# Patient Record
Sex: MALE | Race: OTHER | NOT HISPANIC OR LATINO
[De-identification: names, ages, dates, MRNs, and addresses within clinical notes are randomized per-mention and may not be internally consistent; named-entity substitution may affect disease eponyms.]

---

## 2021-12-01 PROBLEM — Z00.00 ENCOUNTER FOR PREVENTIVE HEALTH EXAMINATION: Status: ACTIVE | Noted: 2021-12-01

## 2021-12-02 ENCOUNTER — APPOINTMENT (OUTPATIENT)
Dept: CT IMAGING | Facility: IMAGING CENTER | Age: 51
End: 2021-12-02
Payer: COMMERCIAL

## 2021-12-02 ENCOUNTER — OUTPATIENT (OUTPATIENT)
Dept: OUTPATIENT SERVICES | Facility: HOSPITAL | Age: 51
LOS: 1 days | End: 2021-12-02
Payer: COMMERCIAL

## 2021-12-02 DIAGNOSIS — Z00.8 ENCOUNTER FOR OTHER GENERAL EXAMINATION: ICD-10-CM

## 2021-12-02 PROCEDURE — 70450 CT HEAD/BRAIN W/O DYE: CPT | Mod: 26

## 2021-12-02 PROCEDURE — 70450 CT HEAD/BRAIN W/O DYE: CPT

## 2023-02-13 ENCOUNTER — EMERGENCY (EMERGENCY)
Facility: HOSPITAL | Age: 53
LOS: 1 days | Discharge: HOME | End: 2023-02-13
Attending: EMERGENCY MEDICINE | Admitting: EMERGENCY MEDICINE
Payer: COMMERCIAL

## 2023-02-13 VITALS
OXYGEN SATURATION: 100 % | TEMPERATURE: 98 F | DIASTOLIC BLOOD PRESSURE: 62 MMHG | SYSTOLIC BLOOD PRESSURE: 106 MMHG | HEART RATE: 75 BPM | RESPIRATION RATE: 16 BRPM

## 2023-02-13 VITALS
OXYGEN SATURATION: 100 % | HEART RATE: 78 BPM | TEMPERATURE: 98 F | RESPIRATION RATE: 18 BRPM | SYSTOLIC BLOOD PRESSURE: 112 MMHG | DIASTOLIC BLOOD PRESSURE: 74 MMHG

## 2023-02-13 PROCEDURE — 72100 X-RAY EXAM L-S SPINE 2/3 VWS: CPT | Mod: 26

## 2023-02-13 PROCEDURE — 71101 X-RAY EXAM UNILAT RIBS/CHEST: CPT | Mod: 26,LT

## 2023-02-13 PROCEDURE — 71046 X-RAY EXAM CHEST 2 VIEWS: CPT | Mod: 26

## 2023-02-13 PROCEDURE — 99284 EMERGENCY DEPT VISIT MOD MDM: CPT

## 2023-02-13 RX ORDER — CYCLOBENZAPRINE HYDROCHLORIDE 10 MG/1
10 TABLET, FILM COATED ORAL ONCE
Refills: 0 | Status: COMPLETED | OUTPATIENT
Start: 2023-02-13 | End: 2023-02-13

## 2023-02-13 RX ORDER — OXYCODONE HYDROCHLORIDE 5 MG/1
5 TABLET ORAL ONCE
Refills: 0 | Status: DISCONTINUED | OUTPATIENT
Start: 2023-02-13 | End: 2023-02-13

## 2023-02-13 RX ORDER — LIDOCAINE 4 G/100G
1 CREAM TOPICAL ONCE
Refills: 0 | Status: COMPLETED | OUTPATIENT
Start: 2023-02-13 | End: 2023-02-13

## 2023-02-13 RX ORDER — ACETAMINOPHEN 500 MG
975 TABLET ORAL ONCE
Refills: 0 | Status: COMPLETED | OUTPATIENT
Start: 2023-02-13 | End: 2023-02-13

## 2023-02-13 RX ORDER — METHOCARBAMOL 500 MG/1
1500 TABLET, FILM COATED ORAL ONCE
Refills: 0 | Status: DISCONTINUED | OUTPATIENT
Start: 2023-02-13 | End: 2023-02-13

## 2023-02-13 RX ADMIN — CYCLOBENZAPRINE HYDROCHLORIDE 10 MILLIGRAM(S): 10 TABLET, FILM COATED ORAL at 09:43

## 2023-02-13 RX ADMIN — Medication 975 MILLIGRAM(S): at 09:43

## 2023-02-13 RX ADMIN — OXYCODONE HYDROCHLORIDE 5 MILLIGRAM(S): 5 TABLET ORAL at 11:25

## 2023-02-13 RX ADMIN — LIDOCAINE 1 PATCH: 4 CREAM TOPICAL at 09:43

## 2023-02-13 NOTE — ED ADULT NURSE NOTE - NSIMPLEMENTINTERV_GEN_ALL_ED
Implemented All Universal Safety Interventions:  [place] to call system. Call bell, personal items and telephone within reach. Instruct patient to call for assistance. Room bathroom lighting operational. Non-slip footwear when patient is off stretcher. Physically safe environment: no spills, clutter or unnecessary equipment. Stretcher in lowest position, wheels locked, appropriate side rails in place. Implemented All Universal Safety Interventions:  [place] to call system. Call bell, personal items and telephone within reach. Instruct patient to call for assistance. Room bathroom lighting operational. Non-slip footwear when patient is off stretcher. Physically safe environment: no spills, clutter or unnecessary equipment. Stretcher in lowest position, wheels locked, appropriate side rails in place. Implemented All Universal Safety Interventions:  [place] to call system. Call bell, personal items and telephone within reach. Instruct patient to call for assistance. Room bathroom lighting operational. Non-slip footwear when patient is off stretcher. Physically safe environment: no spills, clutter or unnecessary equipment. Stretcher in lowest position, wheels locked, appropriate side rails in place.

## 2023-02-13 NOTE — ED ADULT TRIAGE NOTE - CHIEF COMPLAINT QUOTE
Pt. c/o non-radiating lower back pain after lifting something heavy yesterday. States he felt like something pulled. Denies tingling/numbness to extremities. Ambulatory in triage.

## 2023-02-13 NOTE — ED PROVIDER NOTE - PHYSICAL EXAMINATION
GEN - NAD; well appearing; A+O x3   HEAD - NC/AT   EYES- PERRL, EOMI  ENT: Airway patent, mmm, Oral cavity and pharynx normal. No inflammation, swelling, exudate, or lesions.    NECK: Neck supple  PULMONARY - CTA b/l, symmetric breath sounds.   CARDIAC -s1s2, RRR, no M,G,R, no crepitus, mild ttp to left lower ribs  ABDOMEN - +BS, ND, NT, soft, no guarding, no rebound, no masses   BACK - no CVA tenderness, No deformity, no bruising, no stepoff, +midline lower lumbar spinal ttp   EXTREMITIES - FROM, no edema   SKIN - no rash or bruising   NEUROLOGIC - alert, speech clear, 5/5 str b/l ue and le, SILT, rectal tone normal (performed by dr. hamlin).  PSYCH -nl mood/affect, nl insight. GEN - NAD; well appearing; A+O x3   HEAD - NC/AT   EYES- PERRL, EOMI  ENT: Airway patent, mmm, Oral cavity and pharynx normal. No inflammation, swelling, exudate, or lesions.    NECK: Neck supple  PULMONARY - CTA b/l, symmetric breath sounds.   CARDIAC -s1s2, RRR, no M,G,R, no crepitus, mild ttp to left lower ribs  ABDOMEN - +BS, ND, NT, soft, no guarding, no rebound, no masses   BACK - no CVA tenderness, No deformity, no bruising, no stepoff, +midline lower lumbar spinal ttp   EXTREMITIES - FROM, no edema   SKIN - no rash or bruising   NEUROLOGIC - alert, speech clear, 5/5 str b/l ue and le, SILT, rectal tone normal (performed by dr. halmin).  PSYCH -nl mood/affect, nl insight.

## 2023-02-13 NOTE — ED PROVIDER NOTE - CLINICAL SUMMARY MEDICAL DECISION MAKING FREE TEXT BOX
No problem 52-year-old male presenting to the ED with lower back pain and left lower rib pain status post reaching injury as well as fall forward.  No head trauma no LOC, has been having constant back pain since then radiating down to the top of his legs, no red flag symptoms for cord compression, given has midline spinal tenderness will get x-ray L-spine also x-ray chest ribsto evaluate for fracture.  Will provide t pain control and likely refer to spine center for further work-up.  Patient not driving today. 52-year-old male presenting to the ED with lower back pain and left lower rib pain status post reaching injury as well as fall forward.  No head trauma no LOC, has been having constant back pain since then radiating down to the top of his legs, no red flag symptoms for cord compression, given has midline spinal tenderness will get x-ray L-spine also x-ray chest ribsto evaluate for fracture.  Will provide t pain control and likely refer to spine center for further work-up.  Patient not driving today.

## 2023-02-13 NOTE — ED PROVIDER NOTE - PATIENT PORTAL LINK FT
You can access the FollowMyHealth Patient Portal offered by [facility] by registering at the following website: [email or web address]. By joining [facility]’s FollowMyHealth portal, you will also be able to view your health information using other applications (apps) compatible with our system. You can access the FollowMyHealth Patient Portal offered by [facility] by registering at the following website: [email or web address]. By joining [facility]’s FollowMyHealth portal, you will also be able to view your health information using other applications (apps) compatible with our system. You can access the FollowMyHealth Patient Portal offered by [facility] by registering at the following website: [email or web address]. By joining [facility]’s FollowMyHealth portal, you will also be able to view your health information using other applications (apps) compatible with our system.

## 2023-02-13 NOTE — ED PROVIDER NOTE - OBJECTIVE STATEMENT
52-year-old male presenting to the ED with lower back pain.  Patient reports yesterday he was leaning forward, lost his balance and landed on his chest, no head trauma or LOC, injured his lower back, since then has been having constant severe lower back pain which occasionally radiates down the top of his legs.  No bowel or bladder incontinence or retention, no weakness, no numbness, no tingling.  Also describes pain to left lower rib region, no shortness of breath, no bruising.  Describes lower back pain with any movements, took 600 of ibuprofen at 7 AM today with minimal relief so came to ED.  No recent fevers, chills, cough, shortness of breath, abdominal pain, vomiting, diarrhea, dysuria, weight loss.  No history of IVDU.

## 2023-02-13 NOTE — ED ADULT NURSE NOTE - OBJECTIVE STATEMENT
Pt presents to ED ambulatory with steady gait c/o non-radiating lower back pain after lifting something heavy yesterday. States he felt like something pulled. Denies tingling/numbness, loss of bowel or bladder, or other complaints.

## 2023-02-13 NOTE — ED PROVIDER NOTE - PROGRESS NOTE DETAILS
velma GREGORIO PGY-1: After patient treated with oxycodone, patient feels better.  Patient with wife.  Wife taking care of patient at home.  Patient and wife feel that they can manage this at home.  With Tylenol and ibuprofen and hot packs and supportive care.  Will refer to spine center, saw discharge center already saw patient.  This is documentation of that   Conversation. velma GREOGRIO PGY-1: After patient treated with oxycodone, patient feels better.  Patient with wife.  Wife taking care of patient at home.  Patient and wife feel that they can manage this at home.  With Tylenol and ibuprofen and hot packs and supportive care.  Will refer to spine center, saw discharge center already saw patient.  This is documentation of that   Conversation. velma GREGORIO PGY-1: After patient treated with oxycodone, patient feels better.  Patient with wife.  Wife taking care of patient at home.  Patient and wife feel that they can manage this at home.  With Tylenol and ibuprofen and hot packs and supportive care.  Will refer to spine center, saw discharge center already saw patient.  This is documentation of that   Conversation. Return precautions discussed.

## 2023-02-13 NOTE — ED PROVIDER NOTE - NSFOLLOWUPINSTRUCTIONS_ED_ALL_ED_FT
- You came to the emergency room because you hurt your back.  I was looking for worrisome indications of the emergency room.  I did not see them.  That would include incontinence of stool or urine.  Loss of sensation down your legs completely, loss of sensation where you wipe her bottom after a bowel movement.  Please take supportive care at home.  Use Tylenol and ibuprofen as we discussed.  Use heating pads.  I will provide you a note to do light work as tolerated.  I will refer you to the spine center.  They will give you a call.    - Your testing/exams was/were reassuring that dangerous emergencies/conditions are less likely to be occurring or to have occurred.    - Take all medications as directed.    - For pain or fever you can take ibuprofen (motrin, advil) or acetaminophen (tylenol) as needed, as directed on packaging.  - Follow up with your primary doctor within 5 days as directed.  - If you had labs or imaging done, you were given copies of all labs and/or imaging results from your er visit--please take them with you to your follow up appointments.  - If needed, call patient access services at [phone] to find a primary care physician (PCP). Call this number to follow up with a specialty service, such as the spine clinic. If you need this, call and say you were recently in the emergency department and you are calling, per my orders.   - Make sure you do not require a primary care physician's referral if you make a specialty clinic appointment directly. Some insurance requires you to see your PCP, get a referral, then make a specialty appointment. - You came to the emergency room because you hurt your back.  I was looking for worrisome indications of the emergency room.  I did not see them.  That would include incontinence of stool or urine.  Loss of sensation down your legs completely, loss of sensation where you wipe her bottom after a bowel movement.  Please take supportive care at home.  Use Tylenol and ibuprofen as we discussed.  Use heating pads.  I will provide you a note to do light work as tolerated.  I will refer you to the spine center.  They will give you a call.    - Your testing/exams was/were reassuring that dangerous emergencies/conditions are less likely to be occurring or to have occurred.    - Take all medications as directed.    - For pain or fever you can take ibuprofen (motrin, advil) or acetaminophen (tylenol) as needed, as directed on packaging.  - Follow up with your primary doctor within 5 days as directed.  - If you had labs or imaging done, you were given copies of all labs and/or imaging results from your er visit--please take them with you to your follow up appointments.  - If needed, call patient access services at [phone] to find a primary care physician (PCP). Call this number to follow up with a specialty service, such as the spine clinic. If you need this, call and say you were recently in the emergency department and you are calling, per my orders.   - Make sure you do not require a primary care physician's referral if you make a specialty clinic appointment directly. Some insurance requires you to see your PCP, get a referral, then make a specialty appointment. - You came to the emergency room because you hurt your back.  I was looking for worrisome indications of the emergency room.  I did not see them.  That would include incontinence of stool or urine.  Loss of sensation down your legs completely, loss of sensation where you wipe her bottom after a bowel movement.  Please take supportive care at home.  Use Tylenol and ibuprofen as we discussed.  Use heating pads.  I will provide you a note to do light work as tolerated.  I will refer you to the spine center.  They will give you a call.    - Your testing/exams was/were reassuring that dangerous emergencies/conditions are less likely to be occurring or to have occurred.    - Take all medications as directed.    - For pain or fever you can take ibuprofen (motrin, advil) or acetaminophen (tylenol) as needed, as directed on packaging.  - Follow up with your primary doctor within 5 days as directed.  - If you had labs or imaging done, you were given copies of all labs and/or imaging results from your er visit--please take them with you to your follow up appointments.  - If needed, call patient access services at [phone] to find a primary care physician (PCP). Call this number to follow up with a specialty service, such as the spine clinic. If you need this, call and say you were recently in the emergency department and you are calling, per my orders.   - Make sure you do not require a primary care physician's referral if you make a specialty clinic appointment directly. Some insurance requires you to see your PCP, get a referral, then make a specialty appointment.

## 2024-03-27 ENCOUNTER — INPATIENT (INPATIENT)
Facility: HOSPITAL | Age: 54
LOS: 1 days | Discharge: HOME | DRG: 287 | End: 2024-03-29
Attending: STUDENT IN AN ORGANIZED HEALTH CARE EDUCATION/TRAINING PROGRAM | Admitting: HOSPITALIST
Payer: COMMERCIAL

## 2024-03-27 VITALS
RESPIRATION RATE: 18 BRPM | DIASTOLIC BLOOD PRESSURE: 72 MMHG | SYSTOLIC BLOOD PRESSURE: 118 MMHG | OXYGEN SATURATION: 99 % | HEART RATE: 75 BPM | HEIGHT: 64 IN | TEMPERATURE: 98 F | WEIGHT: 130.07 LBS

## 2024-03-27 DIAGNOSIS — R55 SYNCOPE AND COLLAPSE: ICD-10-CM

## 2024-03-27 DIAGNOSIS — E78.5 HYPERLIPIDEMIA, UNSPECIFIED: ICD-10-CM

## 2024-03-27 DIAGNOSIS — M79.602 PAIN IN LEFT ARM: ICD-10-CM

## 2024-03-27 LAB
ALBUMIN SERPL ELPH-MCNC: 4.2 G/DL — SIGNIFICANT CHANGE UP (ref 3.3–5)
ALP SERPL-CCNC: 89 U/L — SIGNIFICANT CHANGE UP (ref 40–120)
ALT FLD-CCNC: 43 U/L — SIGNIFICANT CHANGE UP (ref 10–45)
ANION GAP SERPL CALC-SCNC: 12 MMOL/L — SIGNIFICANT CHANGE UP (ref 5–17)
APTT BLD: 27.4 SEC — SIGNIFICANT CHANGE UP (ref 24.5–35.6)
AST SERPL-CCNC: 24 U/L — SIGNIFICANT CHANGE UP (ref 10–40)
BASE EXCESS BLDV CALC-SCNC: 0.6 MMOL/L — SIGNIFICANT CHANGE UP (ref -2–3)
BASOPHILS # BLD AUTO: 0.02 K/UL — SIGNIFICANT CHANGE UP (ref 0–0.2)
BASOPHILS NFR BLD AUTO: 0.2 % — SIGNIFICANT CHANGE UP (ref 0–2)
BILIRUB SERPL-MCNC: 0.2 MG/DL — SIGNIFICANT CHANGE UP (ref 0.2–1.2)
BUN SERPL-MCNC: 26 MG/DL — HIGH (ref 7–23)
CA-I SERPL-SCNC: 1.22 MMOL/L — SIGNIFICANT CHANGE UP (ref 1.15–1.33)
CALCIUM SERPL-MCNC: 9.2 MG/DL — SIGNIFICANT CHANGE UP (ref 8.4–10.5)
CHLORIDE BLDV-SCNC: 103 MMOL/L — SIGNIFICANT CHANGE UP (ref 96–108)
CHLORIDE SERPL-SCNC: 102 MMOL/L — SIGNIFICANT CHANGE UP (ref 96–108)
CO2 BLDV-SCNC: 30 MMOL/L — HIGH (ref 22–26)
CO2 SERPL-SCNC: 23 MMOL/L — SIGNIFICANT CHANGE UP (ref 22–31)
CREAT SERPL-MCNC: 0.99 MG/DL — SIGNIFICANT CHANGE UP (ref 0.5–1.3)
EGFR: 91 ML/MIN/1.73M2 — SIGNIFICANT CHANGE UP
EOSINOPHIL # BLD AUTO: 0.15 K/UL — SIGNIFICANT CHANGE UP (ref 0–0.5)
EOSINOPHIL NFR BLD AUTO: 1.5 % — SIGNIFICANT CHANGE UP (ref 0–6)
GAS PNL BLDV: 135 MMOL/L — LOW (ref 136–145)
GAS PNL BLDV: SIGNIFICANT CHANGE UP
GLUCOSE BLDV-MCNC: 109 MG/DL — HIGH (ref 70–99)
GLUCOSE SERPL-MCNC: 113 MG/DL — HIGH (ref 70–99)
HCO3 BLDV-SCNC: 28 MMOL/L — SIGNIFICANT CHANGE UP (ref 22–29)
HCT VFR BLD CALC: 41.2 % — SIGNIFICANT CHANGE UP (ref 39–50)
HCT VFR BLDA CALC: 42 % — SIGNIFICANT CHANGE UP (ref 39–51)
HGB BLD CALC-MCNC: 14.1 G/DL — SIGNIFICANT CHANGE UP (ref 12.6–17.4)
HGB BLD-MCNC: 13.9 G/DL — SIGNIFICANT CHANGE UP (ref 13–17)
IMM GRANULOCYTES NFR BLD AUTO: 0.4 % — SIGNIFICANT CHANGE UP (ref 0–0.9)
INR BLD: 1.07 RATIO — SIGNIFICANT CHANGE UP (ref 0.85–1.18)
LACTATE BLDV-MCNC: 1.2 MMOL/L — SIGNIFICANT CHANGE UP (ref 0.5–2)
LYMPHOCYTES # BLD AUTO: 2.27 K/UL — SIGNIFICANT CHANGE UP (ref 1–3.3)
LYMPHOCYTES # BLD AUTO: 22.5 % — SIGNIFICANT CHANGE UP (ref 13–44)
MAGNESIUM SERPL-MCNC: 2.2 MG/DL — SIGNIFICANT CHANGE UP (ref 1.6–2.6)
MCHC RBC-ENTMCNC: 29.5 PG — SIGNIFICANT CHANGE UP (ref 27–34)
MCHC RBC-ENTMCNC: 33.7 GM/DL — SIGNIFICANT CHANGE UP (ref 32–36)
MCV RBC AUTO: 87.5 FL — SIGNIFICANT CHANGE UP (ref 80–100)
MONOCYTES # BLD AUTO: 0.59 K/UL — SIGNIFICANT CHANGE UP (ref 0–0.9)
MONOCYTES NFR BLD AUTO: 5.9 % — SIGNIFICANT CHANGE UP (ref 2–14)
NEUTROPHILS # BLD AUTO: 7 K/UL — SIGNIFICANT CHANGE UP (ref 1.8–7.4)
NEUTROPHILS NFR BLD AUTO: 69.5 % — SIGNIFICANT CHANGE UP (ref 43–77)
NRBC # BLD: 0 /100 WBCS — SIGNIFICANT CHANGE UP (ref 0–0)
NT-PROBNP SERPL-SCNC: <36 PG/ML — SIGNIFICANT CHANGE UP (ref 0–300)
PCO2 BLDV: 54 MMHG — SIGNIFICANT CHANGE UP (ref 42–55)
PH BLDV: 7.32 — SIGNIFICANT CHANGE UP (ref 7.32–7.43)
PHOSPHATE SERPL-MCNC: 4 MG/DL — SIGNIFICANT CHANGE UP (ref 2.5–4.5)
PLATELET # BLD AUTO: 224 K/UL — SIGNIFICANT CHANGE UP (ref 150–400)
PO2 BLDV: 28 MMHG — SIGNIFICANT CHANGE UP (ref 25–45)
POTASSIUM BLDV-SCNC: 4 MMOL/L — SIGNIFICANT CHANGE UP (ref 3.5–5.1)
POTASSIUM SERPL-MCNC: 4 MMOL/L — SIGNIFICANT CHANGE UP (ref 3.5–5.3)
POTASSIUM SERPL-SCNC: 4 MMOL/L — SIGNIFICANT CHANGE UP (ref 3.5–5.3)
PROT SERPL-MCNC: 7.1 G/DL — SIGNIFICANT CHANGE UP (ref 6–8.3)
PROTHROM AB SERPL-ACNC: 11.8 SEC — SIGNIFICANT CHANGE UP (ref 9.5–13)
RBC # BLD: 4.71 M/UL — SIGNIFICANT CHANGE UP (ref 4.2–5.8)
RBC # FLD: 12.7 % — SIGNIFICANT CHANGE UP (ref 10.3–14.5)
SAO2 % BLDV: 40.9 % — LOW (ref 67–88)
SODIUM SERPL-SCNC: 137 MMOL/L — SIGNIFICANT CHANGE UP (ref 135–145)
TROPONIN T, HIGH SENSITIVITY RESULT: <6 NG/L — SIGNIFICANT CHANGE UP (ref 0–51)
TROPONIN T, HIGH SENSITIVITY RESULT: <6 NG/L — SIGNIFICANT CHANGE UP (ref 0–51)
WBC # BLD: 10.07 K/UL — SIGNIFICANT CHANGE UP (ref 3.8–10.5)
WBC # FLD AUTO: 10.07 K/UL — SIGNIFICANT CHANGE UP (ref 3.8–10.5)

## 2024-03-27 PROCEDURE — 73030 X-RAY EXAM OF SHOULDER: CPT | Mod: 26,LT

## 2024-03-27 PROCEDURE — 72125 CT NECK SPINE W/O DYE: CPT | Mod: 26,MC

## 2024-03-27 PROCEDURE — 73110 X-RAY EXAM OF WRIST: CPT | Mod: 26,LT

## 2024-03-27 PROCEDURE — 73562 X-RAY EXAM OF KNEE 3: CPT | Mod: 26,LT

## 2024-03-27 PROCEDURE — 93010 ELECTROCARDIOGRAM REPORT: CPT

## 2024-03-27 PROCEDURE — 73120 X-RAY EXAM OF HAND: CPT | Mod: 26,LT

## 2024-03-27 PROCEDURE — 73090 X-RAY EXAM OF FOREARM: CPT | Mod: 26,LT

## 2024-03-27 PROCEDURE — 73070 X-RAY EXAM OF ELBOW: CPT | Mod: 26,LT

## 2024-03-27 PROCEDURE — 73060 X-RAY EXAM OF HUMERUS: CPT | Mod: 26,LT

## 2024-03-27 PROCEDURE — 99285 EMERGENCY DEPT VISIT HI MDM: CPT

## 2024-03-27 PROCEDURE — 70450 CT HEAD/BRAIN W/O DYE: CPT | Mod: 26,MC

## 2024-03-27 PROCEDURE — 71100 X-RAY EXAM RIBS UNI 2 VIEWS: CPT | Mod: 26

## 2024-03-27 PROCEDURE — 73020 X-RAY EXAM OF SHOULDER: CPT | Mod: 26,59,LT

## 2024-03-27 PROCEDURE — 99223 1ST HOSP IP/OBS HIGH 75: CPT

## 2024-03-27 PROCEDURE — 71045 X-RAY EXAM CHEST 1 VIEW: CPT | Mod: 26,59

## 2024-03-27 RX ORDER — TETANUS TOXOID, REDUCED DIPHTHERIA TOXOID AND ACELLULAR PERTUSSIS VACCINE, ADSORBED 5; 2.5; 8; 8; 2.5 [IU]/.5ML; [IU]/.5ML; UG/.5ML; UG/.5ML; UG/.5ML
0.5 SUSPENSION INTRAMUSCULAR ONCE
Refills: 0 | Status: COMPLETED | OUTPATIENT
Start: 2024-03-27 | End: 2024-03-27

## 2024-03-27 RX ORDER — ACETAMINOPHEN 500 MG
1000 TABLET ORAL ONCE
Refills: 0 | Status: COMPLETED | OUTPATIENT
Start: 2024-03-27 | End: 2024-03-27

## 2024-03-27 RX ORDER — KETOROLAC TROMETHAMINE 30 MG/ML
15 SYRINGE (ML) INJECTION ONCE
Refills: 0 | Status: DISCONTINUED | OUTPATIENT
Start: 2024-03-27 | End: 2024-03-27

## 2024-03-27 RX ORDER — ACETAMINOPHEN 500 MG
650 TABLET ORAL EVERY 6 HOURS
Refills: 0 | Status: DISCONTINUED | OUTPATIENT
Start: 2024-03-27 | End: 2024-03-29

## 2024-03-27 RX ORDER — LIDOCAINE 4 G/100G
1 CREAM TOPICAL DAILY
Refills: 0 | Status: DISCONTINUED | OUTPATIENT
Start: 2024-03-28 | End: 2024-03-29

## 2024-03-27 RX ORDER — KETOROLAC TROMETHAMINE 30 MG/ML
15 SYRINGE (ML) INJECTION EVERY 6 HOURS
Refills: 0 | Status: DISCONTINUED | OUTPATIENT
Start: 2024-03-27 | End: 2024-03-29

## 2024-03-27 RX ORDER — LIDOCAINE 4 G/100G
1 CREAM TOPICAL ONCE
Refills: 0 | Status: COMPLETED | OUTPATIENT
Start: 2024-03-27 | End: 2024-03-27

## 2024-03-27 RX ADMIN — TETANUS TOXOID, REDUCED DIPHTHERIA TOXOID AND ACELLULAR PERTUSSIS VACCINE, ADSORBED 0.5 MILLILITER(S): 5; 2.5; 8; 8; 2.5 SUSPENSION INTRAMUSCULAR at 18:21

## 2024-03-27 RX ADMIN — Medication 15 MILLIGRAM(S): at 21:03

## 2024-03-27 RX ADMIN — LIDOCAINE 1 PATCH: 4 CREAM TOPICAL at 21:05

## 2024-03-27 RX ADMIN — Medication 400 MILLIGRAM(S): at 18:22

## 2024-03-27 NOTE — ED PROVIDER NOTE - OBJECTIVE STATEMENT
53 year old M with no pmhx Although currently being worked up for dizzy spells with a Holter monitor presenting to the emergency department after an episode of syncope.  Patient states that he was walking outside to walk over to his in-laws house next-door, he does not remember anything after that and was found down on the ground about a minute later by his father-in-law.  He hit his head.  Complaining of headache, left arm pain.  Does not remember any chest pain shortness of breath or dizziness prior to falling.  Has been dealing with dizziness but not syncopized as of yet.  Has been feeling well throughout the day without issues.  Denies any alcohol or drug use.  Denies any fevers or chills.

## 2024-03-27 NOTE — H&P ADULT - ASSESSMENT
53M undergoing outpatient work up for dizzy spells s/p Holter monitor presents after a syncopal episode

## 2024-03-27 NOTE — ED PROVIDER NOTE - CLINICAL SUMMARY MEDICAL DECISION MAKING FREE TEXT BOX
53-year-old male being worked up for dizzy spells with a Holter monitor presents after an episode of syncope at home.  Presents with an abrasion to the frontal scalp as well as the left knee as well as pain throughout the entire left upper extremity although with full passive range of motion no bony deformity.  Also having some left lateral chest wall pain.  No crepitus bilateral breath sounds intact.  GCS is 15 currently.  Not on any blood thinners.  Will get CT head CT cervical spine as well as plain film imaging of the areas of concern for evaluation of fracture or dislocation.  Will update tetanus.  Will provide Ofirmev for pain relief.

## 2024-03-27 NOTE — ED PROVIDER NOTE - PHYSICAL EXAMINATION
CONSTITUTIONAL: Well appearing, awake, alert, oriented to person, place, time/situation and in no apparent distress.  HEAD: normocephalic, superficial abrasion left frontal scalp   ENMT: Airway patent, Nasal mucosa clear. Mouth with normal mucosa. No intraoral trauma  EYES: Clear bilaterally, pupils equal, round and reactive to light.  CARDIAC: Normal rate, regular rhythm.  Heart sounds S1, S2.  No murmurs, rubs or gallops.  RESPIRATORY: Breath sounds clear and equal bilaterally.   GASTROINTESTINAL: soft, nontender, no rebound, no guarding   MUSCULOSKELETAL: +TTP throughout entire left arm but with full passive ROM, TTP left lateral chest wall without crepitus, TTP to left knee with superficial with full passive ROM and mild tenderness, otherwise full ROM upper and lower  NEUROLOGICAL: Alert and oriented, no focal deficits, no motor or sensory deficits FTN intact   SKIN: superficial abrasions as above

## 2024-03-27 NOTE — H&P ADULT - NSHPPHYSICALEXAM_GEN_ALL_CORE
Vital Signs Last 24 Hrs  T(C): 36.6 (27 Mar 2024 22:30), Max: 36.8 (27 Mar 2024 17:47)  T(F): 97.9 (27 Mar 2024 22:30), Max: 98.2 (27 Mar 2024 17:47)  HR: 73 (27 Mar 2024 22:30) (72 - 76)  BP: 107/70 (27 Mar 2024 22:30) (107/70 - 133/94)  BP(mean): 80 (27 Mar 2024 22:30) (80 - 108)  RR: 18 (27 Mar 2024 22:30) (16 - 18)  SpO2: 97% (27 Mar 2024 22:30) (97% - 100%)    Parameters below as of 27 Mar 2024 22:30  Patient On (Oxygen Delivery Method): room air Vital Signs Last 24 Hrs  T(C): 36.6 (27 Mar 2024 22:30), Max: 36.8 (27 Mar 2024 17:47)  T(F): 97.9 (27 Mar 2024 22:30), Max: 98.2 (27 Mar 2024 17:47)  HR: 73 (27 Mar 2024 22:30) (72 - 76)  BP: 107/70 (27 Mar 2024 22:30) (107/70 - 133/94)  BP(mean): 80 (27 Mar 2024 22:30) (80 - 108)  RR: 18 (27 Mar 2024 22:30) (16 - 18)  SpO2: 97% (27 Mar 2024 22:30) (97% - 100%)    Parameters below as of 27 Mar 2024 22:30  Patient On (Oxygen Delivery Method): room air    GENERAL: moderate distress from pain, well-developed  HEAD:  abrasion to left forehead, +  L periorbital hematoma , Normocephalic  ENT: EOMI, PERRLA, conjunctiva and sclera clear,  moist mucosa no pharyngeal erythema or exudates   NECK: supple , no JVD   CHEST/LUNG: Clear to auscultation bilaterally; No wheeze, equal breath sounds bilaterally   BACK: No spinal tenderness,  No CVA tenderness   HEART: Regular rate and rhythm; No murmurs, rubs, or gallops  ABDOMEN: Soft, Nontender, Nondistended; Bowel sounds present  EXTREMITIES:  No clubbing, cyanosis, or edema  MSK: + abrasion to left arm and L knee ,  ROM limited on LUE due to pain   PSYCH: Normal behavior/affec  NEUROLOGY: AAOx3, non-focal, cranial nerves intact  SKIN: Normal color, No rashes or lesions

## 2024-03-27 NOTE — ED ADULT NURSE NOTE - NS ED NURSE LEVEL OF CONSCIOUSNESS SPEECH
or night sweats  Ophthalmic ROS: negative for - eye pain or loss of vision  ENT ROS: negative for - hearing change or sore throat  Hematological and Lymphatic ROS: negative for - bruising or weight loss  Endocrine ROS: negative for - malaise/lethargy or unexpected weight changes  Respiratory ROS: no cough, shortness of breath, or wheezing  Cardiovascular ROS: no chest pain or dyspnea on exertion  Gastrointestinal ROS: no abdominal pain, change in bowel habits, or black or bloody stools  Genito-Urinary ROS: no dysuria, trouble voiding, or hematuria  Dermatological ROS: negative for - rash or skin lesion changes    Screenings for behavioral, psychosocial and functional/safety risks, and cognitive dysfunction are all negative except as indicated below. These results, as well as other patient data from the [address] [facility] form, are documented in Flowsheets linked to this Encounter. Allergies   Allergen Reactions    Adhesive Tape      Blisters on skin under tape    Ibuprofen Nausea Only    Naproxen Nausea Only    Vicodin [Hydrocodone-Acetaminophen] Nausea Only     headache      Prior to Visit Medications    Medication Sig Taking?  Authorizing Provider   fluticasone (FLONASE) 50 MCG/ACT nasal spray SHAKE LIQUID AND USE 2 SPRAYS IN EACH NOSTRIL DAILY Yes Helen Castañeda MD   fenofibrate (TRICOR) 145 MG tablet TAKE ONE TABLET BY MOUTH DAILY Yes Helen Castañeda MD   valACYclovir (VALTREX) 1 g tablet TAKE ONE TABLET BY MOUTH DAILY Yes Helen Castañeda MD   QUEtiapine (SEROQUEL) 50 MG tablet Take 100 mg by mouth nightly  Yes Historical Provider, MD   gabapentin (NEURONTIN) 800 MG tablet Take 1 tablet by mouth 4 times daily Yes Helen Castañeda MD   diazepam (VALIUM) 5 MG tablet Take 1 tablet by mouth every 6 hours as needed for Anxiety (Muscle spasms) Yes Radha Aguirre MD   methocarbamol (ROBAXIN) 750 MG tablet Take 1 tablet by mouth every 6 hours as needed (spasm) Yes Radha Aguirre MD   guaiFENesin ([address]) 600 MG  Hepatitis C screen  Completed    HIV screen  Completed     Recommendations for Preventive Services Due: see orders. Recommended screening schedule for the next 5-10 years is provided to the patient in written form: see Patient Instructions/AVS.    LAST LABS   Lab Results   Component Value Date    GLUCOSE 98 11/28/2016     Lab Results   Component Value Date     11/28/2016    K 5.1 11/28/2016    CREATININE 0.6 11/28/2016     Cholesterol, Total   Date Value Ref Range Status   02/08/2017 175 0 - 199 mg/dL Final     LDL Calculated   Date Value Ref Range Status   02/08/2017 100 (H) <100 mg/dL Final     HDL   Date Value Ref Range Status   02/08/2017 59 40 - 60 mg/dL Final     Triglycerides   Date Value Ref Range Status   02/08/2017 78 0 - 150 mg/dL Final     Lab Results   Component Value Date    ALT 8 (L) 11/24/2016    AST 28 11/24/2016    ALKPHOS 50 11/24/2016    BILITOT 0.3 11/24/2016      Lab Results   Component Value Date    WBC 7.7 02/08/2017    HGB 12.7 02/08/2017    HCT 37.7 02/08/2017    .4 (H) 02/08/2017     02/08/2017     TSH (uIU/mL)   Date Value   12/10/2015 2.36     No results found for: LABA1C  The 10-year ASCVD risk score (Ana Yanes et al., 2013) is: 8.4%    Values used to calculate the score:      Age: 59 years      Sex: Female      Is Non- : No      Diabetic: No      Tobacco smoker: Yes      Systolic Blood Pressure: 968 mmHg      Is BP treated: No      HDL Cholesterol: 59 mg/dL      Total Cholesterol: 175 mg/dL  PHYSICAL EXAM:  VITALS:  Blood pressure is Excellent. BP Readings from Last 5 Encounters:   02/02/18 128/80   08/02/17 120/70   01/30/17 120/80   12/16/16 138/84   11/30/16 127/76     Weight is increased.    Wt Readings from Last 5 Encounters:   02/02/18 106 lb (48.1 kg)   08/02/17 103 lb (46.7 kg)   01/30/17 101 lb (45.8 kg)   12/16/16 100 lb (45.4 kg)   11/29/16 99 lb 3.3 oz (45 kg)      Vitals:    02/02/18 1109   BP: 128/80   Site: Right Arm Position: Sitting   Cuff Size: Small Adult   Pulse: 86   Resp: 16   Temp: 98.7 °F (37.1 °C)   TempSrc: Oral   Weight: 106 lb (48.1 kg)   Height: 5' 2\" (1.575 m)     Body mass index is 19.39 kg/m². GENERAL: well-developed, well-nourished, alert, no distress, calm   EYES: negative findings: lids and lashes normal and conjunctivae and sclerae normal  ENT: normal TM's and external ear canals both ears  · External nose and ears appear normal  · Pharynx: normal. Exudates: None  · Lips, mucosa, and tongue normal and dentures in place  · Hearing grossly normal.     NECK: No adenopathy, supple, symmetrical, trachea midline  · Thyroid not enlarged, symmetric, no tenderness/mass/nodules  · no cervical nodes, no supraclavicular nodes  LUNGS:  Breathing unlabored  · clear to auscultation bilaterally and good air movement  CARDIOVASC: regular rate and rhythm, S1, S2 normal, no murmur, click, rub or gallop  LEGS:  Lower extremity edema: none    · No carotid bruits  ABDOMEN: Soft, non-tender, no masses  · No hepatosplenomegaly  · No hernias noted. Exam limited by N/A  SKIN: warm and dry  · No rashes or suspicious lesions  PSYCH:  Alert and oriented  · Normal reasoning, insight good  · Facial expressions full, mood appropriate  · No memory disturbance noted  MUSCULOSKEL:  No significant finger or nail findings  · Spine symmetric, no deformities, kyposis present, and scoliosis   GAIT: UP and Go test: <30 seconds with gait: stiff-legged. Speed Normal.  No significant balance checks. No extra steps on turn around. Assistive device: none      Assessment and Plan:     1. Medicare annual wellness visit, subsequent     2. Pulmonary emphysema, unspecified emphysema type ([address]  Assessment: reasonably well controlled, no significant medication side effects noted. Plan: current treatment plan is effective, no change in therapy. Orders and follow up as documented in EMR. CBC Auto Differential   3.  Fibromyalgia  Stable with current [facility] eye specialist is recommended every 1-2 years to screen for glaucoma; cataracts, macular degeneration, and other eye disorders  · A preventive dental visit is recommended every 6 months  · Try to get at least 150 minutes of exercise per week or 10,000 steps per day on a pedometer  · Order FREE \"Exercise and Physical Activity\" book from the [employer] on Agin6-005-332-9469 or https://order.[place].nih.gov/health/publication/order/  · You need 2338-5875 mg of calcium and 2616-1219 IU of vitamin D per day- it is possible to meet your calcium requirement with diet alone, but a vitamin D supplement is usually necessary to meet this goal  · When exposed to the sun, use a sunscreen that protects against both UVA and UVB radiation with an SPF of 30 or greater- reapply every 2 to 3 hours or after sweating, drying off with a towel, or swimming  · Always wear a seat belt when traveling in a car, and a helmet when riding a bicycle or motorcycle

## 2024-03-27 NOTE — H&P ADULT - NSHPREVIEWOFSYSTEMS_GEN_ALL_CORE
CONSTITUTIONAL: No weakness, fevers or chills  EYES/ENT: No visual changes;  No dysphagia  NECK: No pain or stiffness  RESPIRATORY: No cough, wheezing, hemoptysis; No shortness of breath  CARDIOVASCULAR: No chest pain or palpitations; No lower extremity edema  EXTREMITIES: no le edema, cyanosis, clubbing  GASTROINTESTINAL: No abdominal or epigastric pain. No nausea, vomiting, or hematemesis; No diarrhea or constipation. No melena or hematochezia.  BACK: No back pain  GENITOURINARY: No dysuria, frequency or hematuria  NEUROLOGICAL: No numbness or weakness  MSK: + pain in L shoulder / arm ,  + L knee pain ,  no other  joint swelling or pain  SKIN: No itching, burning, rashes, or lesions   PSYCH: no agitation  All other review of systems is negative unless indicated above.

## 2024-03-27 NOTE — ED PROVIDER NOTE - ATTENDING CONTRIBUTION TO CARE
I have personally performed a face to face medical and diagnostic evaluation of the patient. I have discussed with and reviewed the Resident's note and agree with the History, ROS, Physical Exam and MDM unless otherwise indicated. A brief summary of my personal evaluation and impression can be found below.    53-year-old male being worked up for dizzy spells with a Holter monitor presents after an episode of syncope at home - Fell down 3 steps.  Unable to clarify whether you had any prodromal symptoms. Presents with an abrasion to the frontal scalp as well as the left knee as well as pain throughout the entire left upper extremity.   On exam,  vital signs stable, no obvious deformities and full range of motion of the left upper extremity.   Some reproducible left lateral chest wall pain.  No crepitus bilateral breath sounds intact.  GCS is 15 currently.   patient needs to be evaluated for syncope.  Plan for labs including cardiac markers.  Will assess for possible infectious or metabolic etiology as well.  Will get CT head CT cervical spine as well as plain film imaging of the areas of concern for evaluation of fracture or dislocation.  Will update tetanus.  Will provide Ofirmev for pain relief.   Need admission for syncope workup and trauma workup negative. Morgan Valdez, ED Attending

## 2024-03-27 NOTE — H&P ADULT - PROBLEM SELECTOR PLAN 1
no acute intracranial injuries on CT head , presentation concerning for cardiogenic syncope ,  was wearing holter during episode, may have recorded arrhythmia , no acute ischemia  on ekg and trop < 6   - telemetry   - EP consult for  reading of holter- to be arranged by day team   - TTE  - PT

## 2024-03-27 NOTE — ED ADULT TRIAGE NOTE - CHIEF COMPLAINT QUOTE
fall x3 stairs + LOC retrograde amnesia Does not remember  Denies blood thinners GCS 15 Responds approp to verbal and tactile stimuli  lle and l side rib pain

## 2024-03-27 NOTE — ED ADULT NURSE REASSESSMENT NOTE - NS ED NURSE REASSESS COMMENT FT1
Report received from Tito PORTER. Pt A&Ox4, in no acute distress at time. Family remains at bedside. Patient safety maintained, bed is in lowest position, wheels locked, and side rails raised.

## 2024-03-27 NOTE — H&P ADULT - TIME BILLING
Chart review , case discussion with ED provider , obtain history ,  examination of patient , answering questions and concerns , ordering labs and medications , and documentation

## 2024-03-27 NOTE — ED ADULT NURSE NOTE - OBJECTIVE STATEMENT
54yo M presents to ED brought in by EMS s/p fall. 52yo M presents to ED brought in by EMS s/p fall. pt currently wearing a Holter monitor and has been having an outpt cardiac workup for recent dizziness on exertion, otherwise denies any significant pmhx. EMS reports pt was walking down stairs tonight when he had syncopal episode and fell forwards down 3 cement steps onto his L side. pt does not remember falling or anything just prior to the fall. pt now endorsing pain to his L knee, L ribs, L wrist and head.  on exam pt is awake and alert, oriented x4, speaking in full sentences, breathing even and unlabored, vital signs stable, PERRL, + abrasion to L forehead without active bleeding, abd soft nt nd, + ttp to L lateral chest wall, + ttp throughout LUE, + ttp to L knee, BEJARANO with full ROM x4. MD at bedside for exam. Patient undressed and placed into gown, call bell placed within reach and appropriate side rails up for safety. pending CT scan and radiology.

## 2024-03-27 NOTE — ED PROVIDER NOTE - PROGRESS NOTE DETAILS
Sarah Mcmahon (Rodriguez) PGY3 spoke to hospitalist regarding this patient. he requests that the PMD be contacted to confirm their admission preference re: private v hospitalist service. left a message for PMD.

## 2024-03-27 NOTE — H&P ADULT - HISTORY OF PRESENT ILLNESS
Patient is a 53 year old male w/ no significant past medical history , currently  undergoing outpatient work up for dizzy spells s/p Holter monitor presents after a syncopal episode on day of admission.   Patient was found down on the ground by family outside the home of his In-laws . He reports he was walking but cannot recall passing out. He sustained trauma to his head and reports a headache and left arms pain since the incident. No chest pain or palpitations preceding the episode and no shortness of breath. No recent illness no fever or chills.    Patient is a 53 year old male w/ no significant past medical history , currently  undergoing outpatient work up for dizzy spells s/p Holter monitor presents after a syncopal episode on day of admission.   Patient was found down on the ground by family outside the home of his In-laws . He reports he was walking but cannot recall passing out. He sustained trauma to his head and left side of body  and reports a headache and left arm pain since the incident, worse with movement.  No chest pain or palpitations preceding the episode and no shortness of breath. No recent illness no fever or chills.

## 2024-03-27 NOTE — H&P ADULT - NSHPLABSRESULTS_GEN_ALL_CORE
Labs personally reviewed:                          13.9   10.07 )-----------( 224      ( 27 Mar 2024 18:12 )             41.2     03-27    137  |  102  |  26<H>  ----------------------------<  113<H>  4.0   |  23  |  0.99    Ca    9.2      27 Mar 2024 18:12  Phos  4.0     03-27  Mg     2.2     03-27    TPro  7.1  /  Alb  4.2  /  TBili  0.2  /  DBili  x   /  AST  24  /  ALT  43  /  AlkPhos  89  03-27        LIVER FUNCTIONS - ( 27 Mar 2024 18:12 )  Alb: 4.2 g/dL / Pro: 7.1 g/dL / ALK PHOS: 89 U/L / ALT: 43 U/L / AST: 24 U/L / GGT: x           PT/INR - ( 27 Mar 2024 18:12 )   PT: 11.8 sec;   INR: 1.07 ratio         PTT - ( 27 Mar 2024 18:12 )  PTT:27.4 sec  Urinalysis Basic - ( 27 Mar 2024 18:12 )    Color: x / Appearance: x / SG: x / pH: x  Gluc: 113 mg/dL / Ketone: x  / Bili: x / Urobili: x   Blood: x / Protein: x / Nitrite: x   Leuk Esterase: x / RBC: x / WBC x   Sq Epi: x / Non Sq Epi: x / Bacteria: x      CAPILLARY BLOOD GLUCOSE          Imaging:  CXR personally reviewed: no focal opacity    CT HEAD:  No acute hemorrhage, mass effect, or midline shift.    CT CERVICAL SPINE:  No acute fracture or traumatic subluxation.  Multilevel C5-6 and C6-7 degenerative changes including marginal   osteophytes and disc space narrowing. Narrowing of the RIGHT C4-5 and   BILATERAL C5-6 neural foramina due to uncovertebral spurring and facet   osteophytic hypertrophy. Mild broad-based posterior osteophytic   ridge/disc complexes at C4-5 and C5-6 indent the ventral thecal sac and   abut the ventral cord at C5-6.      Xray  L ribs : No acute displaced left-sided rib fracture identified.    No acute cardiopulmonary process.    Xray L shoulder- humerus- elbow  forarm– hand  no fracture or dislocation      EKG personally reviewed:  NSR at 74 bpm , no s-t changes , qtc 406

## 2024-03-28 ENCOUNTER — TRANSCRIPTION ENCOUNTER (OUTPATIENT)
Age: 54
End: 2024-03-28

## 2024-03-28 ENCOUNTER — RESULT REVIEW (OUTPATIENT)
Age: 54
End: 2024-03-28

## 2024-03-28 LAB
ALBUMIN SERPL ELPH-MCNC: 3.8 G/DL — SIGNIFICANT CHANGE UP (ref 3.3–5)
ALP SERPL-CCNC: 85 U/L — SIGNIFICANT CHANGE UP (ref 40–120)
ALT FLD-CCNC: 37 U/L — SIGNIFICANT CHANGE UP (ref 10–45)
ANION GAP SERPL CALC-SCNC: 13 MMOL/L — SIGNIFICANT CHANGE UP (ref 5–17)
AST SERPL-CCNC: 20 U/L — SIGNIFICANT CHANGE UP (ref 10–40)
BILIRUB SERPL-MCNC: 0.6 MG/DL — SIGNIFICANT CHANGE UP (ref 0.2–1.2)
BUN SERPL-MCNC: 26 MG/DL — HIGH (ref 7–23)
CALCIUM SERPL-MCNC: 9.4 MG/DL — SIGNIFICANT CHANGE UP (ref 8.4–10.5)
CHLORIDE SERPL-SCNC: 104 MMOL/L — SIGNIFICANT CHANGE UP (ref 96–108)
CO2 SERPL-SCNC: 22 MMOL/L — SIGNIFICANT CHANGE UP (ref 22–31)
CREAT SERPL-MCNC: 0.98 MG/DL — SIGNIFICANT CHANGE UP (ref 0.5–1.3)
EGFR: 92 ML/MIN/1.73M2 — SIGNIFICANT CHANGE UP
GLUCOSE BLDC GLUCOMTR-MCNC: 87 MG/DL — SIGNIFICANT CHANGE UP (ref 70–99)
GLUCOSE SERPL-MCNC: 101 MG/DL — HIGH (ref 70–99)
HCT VFR BLD CALC: 38.9 % — LOW (ref 39–50)
HGB BLD-MCNC: 13.3 G/DL — SIGNIFICANT CHANGE UP (ref 13–17)
MCHC RBC-ENTMCNC: 29.5 PG — SIGNIFICANT CHANGE UP (ref 27–34)
MCHC RBC-ENTMCNC: 34.2 GM/DL — SIGNIFICANT CHANGE UP (ref 32–36)
MCV RBC AUTO: 86.3 FL — SIGNIFICANT CHANGE UP (ref 80–100)
NRBC # BLD: 0 /100 WBCS — SIGNIFICANT CHANGE UP (ref 0–0)
PLATELET # BLD AUTO: 216 K/UL — SIGNIFICANT CHANGE UP (ref 150–400)
POTASSIUM SERPL-MCNC: 4.1 MMOL/L — SIGNIFICANT CHANGE UP (ref 3.5–5.3)
POTASSIUM SERPL-SCNC: 4.1 MMOL/L — SIGNIFICANT CHANGE UP (ref 3.5–5.3)
PROT SERPL-MCNC: 6.8 G/DL — SIGNIFICANT CHANGE UP (ref 6–8.3)
RBC # BLD: 4.51 M/UL — SIGNIFICANT CHANGE UP (ref 4.2–5.8)
RBC # FLD: 12.8 % — SIGNIFICANT CHANGE UP (ref 10.3–14.5)
SODIUM SERPL-SCNC: 139 MMOL/L — SIGNIFICANT CHANGE UP (ref 135–145)
WBC # BLD: 7.54 K/UL — SIGNIFICANT CHANGE UP (ref 3.8–10.5)
WBC # FLD AUTO: 7.54 K/UL — SIGNIFICANT CHANGE UP (ref 3.8–10.5)

## 2024-03-28 PROCEDURE — 93010 ELECTROCARDIOGRAM REPORT: CPT

## 2024-03-28 PROCEDURE — 76377 3D RENDER W/INTRP POSTPROCES: CPT | Mod: 26

## 2024-03-28 PROCEDURE — 75574 CT ANGIO HRT W/3D IMAGE: CPT | Mod: 26

## 2024-03-28 PROCEDURE — 93356 MYOCRD STRAIN IMG SPCKL TRCK: CPT

## 2024-03-28 PROCEDURE — 93306 TTE W/DOPPLER COMPLETE: CPT | Mod: 26

## 2024-03-28 PROCEDURE — 99232 SBSQ HOSP IP/OBS MODERATE 35: CPT

## 2024-03-28 RX ADMIN — LIDOCAINE 1 PATCH: 4 CREAM TOPICAL at 20:55

## 2024-03-28 RX ADMIN — LIDOCAINE 1 PATCH: 4 CREAM TOPICAL at 10:30

## 2024-03-28 RX ADMIN — LIDOCAINE 1 PATCH: 4 CREAM TOPICAL at 09:56

## 2024-03-28 RX ADMIN — LIDOCAINE 1 PATCH: 4 CREAM TOPICAL at 17:30

## 2024-03-28 NOTE — PROGRESS NOTE ADULT - PROBLEM SELECTOR PLAN 3
newly diagnosed HLD as outpt with PMD - to start statin this week, can be started as outpt     #new DM - also diagnosed with new DM per pt report, started metformin yesterday for the first time. FS controlled currently, can resume home medication on discharge

## 2024-03-28 NOTE — CONSULT NOTE ADULT - SUBJECTIVE AND OBJECTIVE BOX
DATE OF SERVICE: 03-28-24 @ 13:31    CHIEF COMPLAINT:Patient is a 53y old  Male who presents with a chief complaint of syncope and collapse (28 Mar 2024 11:36)      HISTORY OF PRESENT ILLNESS:HPI:  Patient is a 53 year old male w/ no significant past medical history , currently  undergoing outpatient work up for dizzy spells s/p Holter monitor presents after a syncopal episode on day of admission.   Patient was found down on the ground by family outside the home of his In-laws . He reports he was walking but cannot recall passing out. He sustained trauma to his head and left side of body  and reports a headache and left arm pain since the incident, worse with movement.  No chest pain or palpitations preceding the episode and no shortness of breath. No recent illness no fever or chills.    (27 Mar 2024 22:56)      PAST MEDICAL & SURGICAL HISTORY:  HLD (hyperlipidemia)      Vitamin D deficiency      No significant past surgical history              MEDICATIONS:        acetaminophen     Tablet .. 650 milliGRAM(s) Oral every 6 hours PRN  ketorolac   Injectable 15 milliGRAM(s) IV Push every 6 hours PRN        lidocaine   4% Patch 1 Patch Transdermal daily      FAMILY HISTORY:  FH: CAD (coronary artery disease) (Father)        Non-contributory    SOCIAL HISTORY:    [ ] Tobacco  [ ] Drugs  [ ] Alcohol    Allergies    No Known Allergies    Intolerances    	    REVIEW OF SYSTEMS:  CONSTITUTIONAL: No fever  EYES: No eye pain, visual disturbances, or discharge  ENMT:  No difficulty hearing, tinnitus  NECK: No pain or stiffness  RESPIRATORY: No cough, wheezing,  CARDIOVASCULAR: No chest pain, palpitations, passing out, dizziness, or leg swelling  GASTROINTESTINAL:  No nausea, vomiting, diarrhea or constipation. No melena.  GENITOURINARY: No dysuria, hematuria  NEUROLOGICAL: No stroke like symptoms  SKIN: No burning or lesions   ENDOCRINE: No heat or cold intolerance  MUSCULOSKELETAL: No joint pain or swelling  PSYCHIATRIC: No  anxiety, mood swings  HEME/LYMPH: No bleeding gums  ALLERGY AND IMMUNOLOGIC: No hives or eczema	    All other ROS negative    PHYSICAL EXAM:  T(C): 36.5 (03-28-24 @ 12:00), Max: 36.8 (03-27-24 @ 17:47)  HR: 71 (03-28-24 @ 12:00) (62 - 76)  BP: 107/70 (03-28-24 @ 12:00) (106/67 - 133/94)  RR: 17 (03-28-24 @ 12:00) (16 - 18)  SpO2: 96% (03-28-24 @ 12:00) (96% - 100%)  Wt(kg): --  I&O's Summary    28 Mar 2024 07:01  -  28 Mar 2024 13:31  --------------------------------------------------------  IN: 480 mL / OUT: 400 mL / NET: 80 mL        Appearance: Normal	  HEENT:   Normal oral mucosa, EOMI	  Cardiovascular:  S1 S2, No JVD,    Respiratory: Lungs clear to auscultation	  Psychiatry: Alert  Gastrointestinal:  Soft, Non-tender, + BS	  Skin: No rashes   Neurologic: Non-focal  Extremities:  No edema  Vascular: Peripheral pulses palpable    	    	  	  CARDIAC MARKERS:  Labs personally reviewed by me                                  13.3   7.54  )-----------( 216      ( 28 Mar 2024 05:34 )             38.9     03-28    139  |  104  |  26<H>  ----------------------------<  101<H>  4.1   |  22  |  0.98    Ca    9.4      28 Mar 2024 05:34  Phos  4.0     03-27  Mg     2.2     03-27    TPro  6.8  /  Alb  3.8  /  TBili  0.6  /  DBili  x   /  AST  20  /  ALT  37  /  AlkPhos  85  03-28          EKG: Personally reviewed by me -   Radiology: Personally reviewed by me -   < from: TTE W or WO Ultrasound Enhancing Agent (03.28.24 @ 06:35) >   1. Left ventricular cavity is normal in size. Left ventricular wall thickness is normal. Left ventricular systolic function is normal with an ejection fraction of 61 % by 3D. There are no regional wall motion abnormalities seen.   2. Normal left ventricular diastolic function, with normal filling pressure.   3. Normal right ventricular cavity size, with normal wall thickness, and normal systolic function. Tricuspid annular plane systolic excursion (TAPSE) is 1.7 cm (normal >=1.7 cm).   4. Estimated pulmonary artery systolic pressure is 17 mmHg.   5. No pericardial effusion seen.   6. No prior echocardiogram is available for comparison.   7. Left ventricular global longitudinal strain is -20.3 % which is normal (< -18%). Images were acquired on a [employer] ultrasound system and processed using [employer] strain analysis software with a heart rate of 67 bpm and a blood pressure of 116/65 mmHg    < end of copied text >  < from: CT Head No Cont (03.27.24 @ 19:18) >  CT CERVICAL SPINE:  No acute fracture or traumatic subluxation.  Multilevel C5-6 and C6-7 degenerative changes including marginal   osteophytes and disc space narrowing. Narrowing of the RIGHT C4-5 and   BILATERAL C5-6 neural foramina due to uncovertebral spurring and facet   osteophytic hypertrophy. Mild broad-based posterior osteophytic   ridge/disc complexes at C4-5 and C5-6 indent the ventral thecal sac and   abut the ventral cord at C5-6.          Assessment /Plan:     Patient is a 53 year old male w/ no significant past medical history , currently  undergoing outpatient work up for dizzy spells s/p Holter monitor presents after a syncopal episode on day of admission.   Patient was found down on the ground by family outside the home of his In-laws . He reports he was walking but cannot recall passing out. He sustained trauma to his head and left side of body  and reports a headache and left arm pain since the incident, worse with movement.  No chest pain or palpitations preceding the episode and no shortness of breath. No recent illness no fever or chills.     Differential diagnosis and plan of care discussed with patient after the evaluation. Counseling on diet, nutritional counseling, weight management, exercise and medication compliance was done.   Advanced care planning/advanced directives discussed with patient/family. DNR status including forceful chest compressions to attempt to restart the heart, ventilator support/artificial breathing, electric shock, artificial nutrition, health care proxy, Molst form all discussed with pt. Pt wishes to consider. More than fifteen minutes spent on discussing advanced directives.     Ritika SORIANO-LASHAY Sandoval DO [facility]  Cardiovascular Medicine  [address] [place] [address], [address]  Available for call or text via [employer]  Office [phone]   DATE OF SERVICE: 03-28-24 @ 13:31    CHIEF COMPLAINT:Patient is a 53y old  Male who presents with a chief complaint of syncope and collapse (28 Mar 2024 11:36)      HISTORY OF PRESENT ILLNESS:HPI:  Patient is a 53 year old male w/ no significant past medical history , currently  undergoing outpatient work up for dizzy spells s/p Holter monitor presents after a syncopal episode on day of admission.   Patient was found down on the ground by family outside the home of his In-laws . He reports he was walking but cannot recall passing out. He sustained trauma to his head and left side of body  and reports a headache and left arm pain since the incident, worse with movement.  No chest pain or palpitations preceding the episode and no shortness of breath. No recent illness no fever or chills.    (27 Mar 2024 22:56)      PAST MEDICAL & SURGICAL HISTORY:  HLD (hyperlipidemia)      Vitamin D deficiency      No significant past surgical history              MEDICATIONS:        acetaminophen     Tablet .. 650 milliGRAM(s) Oral every 6 hours PRN  ketorolac   Injectable 15 milliGRAM(s) IV Push every 6 hours PRN        lidocaine   4% Patch 1 Patch Transdermal daily      FAMILY HISTORY:  FH: CAD (coronary artery disease) (Father)        Non-contributory    SOCIAL HISTORY:    [- ] Tobacco  [- ] Drugs  [- ] Alcohol    Allergies    No Known Allergies    Intolerances    	    REVIEW OF SYSTEMS:  CONSTITUTIONAL: No fever  EYES: No eye pain, visual disturbances, or discharge  ENMT:  No difficulty hearing, tinnitus  NECK: No pain or stiffness  RESPIRATORY: No cough, wheezing,  CARDIOVASCULAR: No chest pain, palpitations, + passing out, + dizziness, no leg swelling  GASTROINTESTINAL:  No nausea, vomiting, diarrhea or constipation. No melena.  GENITOURINARY: No dysuria, hematuria  NEUROLOGICAL: No stroke like symptoms  SKIN: No burning or lesions   ENDOCRINE: No heat or cold intolerance  MUSCULOSKELETAL: No joint pain or swelling  PSYCHIATRIC: No  anxiety, mood swings  HEME/LYMPH: No bleeding gums  ALLERGY AND IMMUNOLOGIC: No hives or eczema	    All other ROS negative    PHYSICAL EXAM:  T(C): 36.5 (03-28-24 @ 12:00), Max: 36.8 (03-27-24 @ 17:47)  HR: 71 (03-28-24 @ 12:00) (62 - 76)  BP: 107/70 (03-28-24 @ 12:00) (106/67 - 133/94)  RR: 17 (03-28-24 @ 12:00) (16 - 18)  SpO2: 96% (03-28-24 @ 12:00) (96% - 100%)  Wt(kg): --  I&O's Summary    28 Mar 2024 07:01  -  28 Mar 2024 13:31  --------------------------------------------------------  IN: 480 mL / OUT: 400 mL / NET: 80 mL        Appearance: Normal	  HEENT:   Normal oral mucosa, EOMI	  Cardiovascular:  S1 S2, No JVD,    Respiratory: Lungs clear to auscultation	  Psychiatry: Alert  Gastrointestinal:  Soft, Non-tender, + BS	  Skin: No rashes   Neurologic: Non-focal  Extremities:  No edema  Vascular: Peripheral pulses palpable    	    	  	  CARDIAC MARKERS:  Labs personally reviewed by me                                  13.3   7.54  )-----------( 216      ( 28 Mar 2024 05:34 )             38.9     03-28    139  |  104  |  26<H>  ----------------------------<  101<H>  4.1   |  22  |  0.98    Ca    9.4      28 Mar 2024 05:34  Phos  4.0     03-27  Mg     2.2     03-27    TPro  6.8  /  Alb  3.8  /  TBili  0.6  /  DBili  x   /  AST  20  /  ALT  37  /  AlkPhos  85  03-28          EKG: Personally reviewed by me - NSR  Radiology: Personally reviewed by me -   < from: TTE W or WO Ultrasound Enhancing Agent (03.28.24 @ 06:35) >   1. Left ventricular cavity is normal in size. Left ventricular wall thickness is normal. Left ventricular systolic function is normal with an ejection fraction of 61 % by 3D. There are no regional wall motion abnormalities seen.   2. Normal left ventricular diastolic function, with normal filling pressure.   3. Normal right ventricular cavity size, with normal wall thickness, and normal systolic function. Tricuspid annular plane systolic excursion (TAPSE) is 1.7 cm (normal >=1.7 cm).   4. Estimated pulmonary artery systolic pressure is 17 mmHg.   5. No pericardial effusion seen.   6. No prior echocardiogram is available for comparison.   7. Left ventricular global longitudinal strain is -20.3 % which is normal (< -18%). Images were acquired on a [employer] ultrasound system and processed using [employer] strain analysis software with a heart rate of 67 bpm and a blood pressure of 116/65 mmHg    < end of copied text >  < from: CT Head No Cont (03.27.24 @ 19:18) >  CT CERVICAL SPINE:  No acute fracture or traumatic subluxation.  Multilevel C5-6 and C6-7 degenerative changes including marginal   osteophytes and disc space narrowing. Narrowing of the RIGHT C4-5 and   BILATERAL C5-6 neural foramina due to uncovertebral spurring and facet   osteophytic hypertrophy. Mild broad-based posterior osteophytic   ridge/disc complexes at C4-5 and C5-6 indent the ventral thecal sac and   abut the ventral cord at C5-6.          Assessment /Plan:     Patient is a 53 year old male w/ no significant past medical history , currently  undergoing outpatient work up for dizzy spells s/p Holter monitor presents after a syncopal episode on day of admission.   Patient was found down on the ground by family outside the home of his In-laws . He reports he was walking but cannot recall passing out. He sustained trauma to his head and left side of body  and reports a headache and left arm pain since the incident, worse with movement.  No chest pain or palpitations preceding the episode and no shortness of breath. No recent illness no fever or chills.     1. Syncope  - Has been having dizziness following exertion at the gym relieved with rest  - Syncopal episode with no prodrome  - ECG NSR  - Trop neg  - TTE wnl  - Exercise ST at OP Cards reportedly wnl  - CTH with no acute infarct or hemmorhage  - Cont to monitor on tele  - Risk factor for CAD include DM II, HLD and +FHX (father with CAD)--> Plan for CT cors to r/o ischemia    2. DM II  - Check A1c  - As per patient, recently started on Meformin 500mg PO BID    3. HLD  - Check lipid panel      Differential diagnosis and plan of care discussed with patient after the evaluation. Counseling on diet, nutritional counseling, weight management, exercise and medication compliance was done.   Advanced care planning/advanced directives discussed with patient/family. DNR status including forceful chest compressions to attempt to restart the heart, ventilator support/artificial breathing, electric shock, artificial nutrition, health care proxy, Molst form all discussed with pt. Pt wishes to consider. More than fifteen minutes spent on discussing advanced directives.     Ritika Wills [facility]  Jonathan Sandoval DO [facility]  Cardiovascular Medicine  [address] [place] [address] Dr, [address]  Available for call or text via [employer]  Office [phone]   DATE OF SERVICE: 03-28-24 @ 13:31    CHIEF COMPLAINT:Patient is a 53y old  Male who presents with a chief complaint of syncope and collapse (28 Mar 2024 11:36)      HISTORY OF PRESENT ILLNESS:HPI:  Patient is a 53 year old male w/ no significant past medical history , currently  undergoing outpatient work up for dizzy spells s/p Holter monitor presents after a syncopal episode on day of admission.   Patient was found down on the ground by family outside the home of his In-laws . He reports he was walking but cannot recall passing out. He sustained trauma to his head and left side of body  and reports a headache and left arm pain since the incident, worse with movement.  No chest pain or palpitations preceding the episode and no shortness of breath. No recent illness no fever or chills.    (27 Mar 2024 22:56)      PAST MEDICAL & SURGICAL HISTORY:  HLD (hyperlipidemia)      Vitamin D deficiency      No significant past surgical history              MEDICATIONS:        acetaminophen     Tablet .. 650 milliGRAM(s) Oral every 6 hours PRN  ketorolac   Injectable 15 milliGRAM(s) IV Push every 6 hours PRN        lidocaine   4% Patch 1 Patch Transdermal daily      FAMILY HISTORY:  FH: CAD (coronary artery disease) (Father)        Non-contributory    SOCIAL HISTORY:    [- ] Tobacco  [- ] Drugs  [- ] Alcohol    Allergies    No Known Allergies    Intolerances    	    REVIEW OF SYSTEMS:  CONSTITUTIONAL: No fever  EYES: No eye pain, visual disturbances, or discharge  ENMT:  No difficulty hearing, tinnitus  NECK: No pain or stiffness  RESPIRATORY: No cough, wheezing,  CARDIOVASCULAR: No chest pain, palpitations, + passing out, + dizziness, no leg swelling  GASTROINTESTINAL:  No nausea, vomiting, diarrhea or constipation. No melena.  GENITOURINARY: No dysuria, hematuria  NEUROLOGICAL: No stroke like symptoms  SKIN: No burning or lesions   ENDOCRINE: No heat or cold intolerance  MUSCULOSKELETAL: No joint pain or swelling  PSYCHIATRIC: No  anxiety, mood swings  HEME/LYMPH: No bleeding gums  ALLERGY AND IMMUNOLOGIC: No hives or eczema	    All other ROS negative    PHYSICAL EXAM:  T(C): 36.5 (03-28-24 @ 12:00), Max: 36.8 (03-27-24 @ 17:47)  HR: 71 (03-28-24 @ 12:00) (62 - 76)  BP: 107/70 (03-28-24 @ 12:00) (106/67 - 133/94)  RR: 17 (03-28-24 @ 12:00) (16 - 18)  SpO2: 96% (03-28-24 @ 12:00) (96% - 100%)  Wt(kg): --  I&O's Summary    28 Mar 2024 07:01  -  28 Mar 2024 13:31  --------------------------------------------------------  IN: 480 mL / OUT: 400 mL / NET: 80 mL        Appearance: Normal	  HEENT:   Normal oral mucosa, EOMI	  Cardiovascular:  S1 S2, No JVD,    Respiratory: Lungs clear to auscultation	  Psychiatry: Alert  Gastrointestinal:  Soft, Non-tender, + BS	  Skin: No rashes   Neurologic: Non-focal  Extremities:  No edema  Vascular: Peripheral pulses palpable    	    	  	  CARDIAC MARKERS:  Labs personally reviewed by me                                  13.3   7.54  )-----------( 216      ( 28 Mar 2024 05:34 )             38.9     03-28    139  |  104  |  26<H>  ----------------------------<  101<H>  4.1   |  22  |  0.98    Ca    9.4      28 Mar 2024 05:34  Phos  4.0     03-27  Mg     2.2     03-27    TPro  6.8  /  Alb  3.8  /  TBili  0.6  /  DBili  x   /  AST  20  /  ALT  37  /  AlkPhos  85  03-28          EKG: Personally reviewed by me - NSR  Radiology: Personally reviewed by me -   < from: TTE W or WO Ultrasound Enhancing Agent (03.28.24 @ 06:35) >   1. Left ventricular cavity is normal in size. Left ventricular wall thickness is normal. Left ventricular systolic function is normal with an ejection fraction of 61 % by 3D. There are no regional wall motion abnormalities seen.   2. Normal left ventricular diastolic function, with normal filling pressure.   3. Normal right ventricular cavity size, with normal wall thickness, and normal systolic function. Tricuspid annular plane systolic excursion (TAPSE) is 1.7 cm (normal >=1.7 cm).   4. Estimated pulmonary artery systolic pressure is 17 mmHg.   5. No pericardial effusion seen.   6. No prior echocardiogram is available for comparison.   7. Left ventricular global longitudinal strain is -20.3 % which is normal (< -18%). Images were acquired on a [employer] ultrasound system and processed using [employer] strain analysis software with a heart rate of 67 bpm and a blood pressure of 116/65 mmHg    < end of copied text >  < from: CT Head No Cont (03.27.24 @ 19:18) >  CT CERVICAL SPINE:  No acute fracture or traumatic subluxation.  Multilevel C5-6 and C6-7 degenerative changes including marginal   osteophytes and disc space narrowing. Narrowing of the RIGHT C4-5 and   BILATERAL C5-6 neural foramina due to uncovertebral spurring and facet   osteophytic hypertrophy. Mild broad-based posterior osteophytic   ridge/disc complexes at C4-5 and C5-6 indent the ventral thecal sac and   abut the ventral cord at C5-6.          Assessment /Plan:     Patient is a 53 year old male w/ no significant past medical history , currently  undergoing outpatient work up for dizzy spells s/p Holter monitor presents after a syncopal episode on day of admission.   Patient was found down on the ground by family outside the home of his In-laws . He reports he was walking but cannot recall passing out. He sustained trauma to his head and left side of body  and reports a headache and left arm pain since the incident, worse with movement.  No chest pain or palpitations preceding the episode and no shortness of breath. No recent illness no fever or chills.     1. Syncope  - Has been having dizziness following exertion at the gym relieved with rest  - Syncopal episode with no prodrome  - ECG NSR  - Trop neg  - TTE wnl  - Exercise ST at OP Cards reportedly wnl  - CTH with no acute infarct or hemmorhage  - Cont to monitor on tele  - Risk factor for CAD include DM II, HLD and +FHX (father with CAD)--> Plan for CT cors to r/o ischemia  - CTA heart done, awaiting report    2. DM II  - Check A1c  - As per patient, recently started on Meformin 500mg PO BID    3. HLD  - Check lipid panel      Differential diagnosis and plan of care discussed with patient after the evaluation. Counseling on diet, nutritional counseling, weight management, exercise and medication compliance was done.   Advanced care planning/advanced directives discussed with patient/family. DNR status including forceful chest compressions to attempt to restart the heart, ventilator support/artificial breathing, electric shock, artificial nutrition, health care proxy, Molst form all discussed with pt. Pt wishes to consider. More than fifteen minutes spent on discussing advanced directives.     Ritika Wills AGN-BC  Jonathan Sandoval DO [facility]  Cardiovascular Medicine  [address] [place] [address] Dr, [address]  Available for call or text via [employer]  Office [phone]

## 2024-03-28 NOTE — PHYSICAL THERAPY INITIAL EVALUATION ADULT - NSACTIVITYREC_GEN_A_PT
Pt demonstrates safe and  independent functional mobility. No acute restorative PT services recommended this time.

## 2024-03-28 NOTE — PROGRESS NOTE ADULT - PROBLEM SELECTOR PLAN 1
Pt with recent cardiac work-up as outpt for lightheadedness that presents only after working out (when he would step off treadmill) - had TTE and stress test that is reportedly normal. Had Holter placed for arrythmia.    - p/w syncope on day of admission, does not recall events at all, significant bruising on L side of his body; denies any syncope previously    - CTH negative for acute pathology  - TTE repeated showing normal LV function, no pathology. trop <6, no events on tele, EKG non ischemic    - will ask EP to interrogate Holter monitor   - c/t monitor on tele for now   - cardiology (Dr Sandoval) consulted   - PT recs no needs  - of note, pt took first dose of metformin on day of syncope

## 2024-03-28 NOTE — PHYSICAL THERAPY INITIAL EVALUATION ADULT - PERTINENT HX OF CURRENT PROBLEM, REHAB EVAL
53 year old M with no pmhx Although currently being worked up for dizzy spells with a Holter monitor presenting to the emergency department after an episode of syncope.  Patient states that he was walking outside to walk over to his in-laws house next-door, he does not remember anything after that and was found down on the ground about a minute later by his father-in-law.  He hit his head.  Complaining of headache, left arm pain.  Does not remember any chest pain shortness of breath or dizziness prior to falling.  Has been dealing with dizziness but not syncopized as of yet. s/p Holter monitor presents after a syncopal episode on day of admission. presentation concerning for cardiogenic syncope ,  was wearing holter during episode, may have recorded arrhythmia , no acute ischemia  on ekg and trop < 6   - telemetry   - EP consult for  reading of holter- to be arranged by day team no acute intracranial injuries on CT head. Xray  L ribs : No acute displaced left-sided rib fracture identified.  No acute cardiopulmonary process.Xray L shoulder- humerus- elbow  forarm– hand  no fracture or dislocation. CT CERVICAL SPINE:No acute fracture or traumatic subluxation. CTH - No acute findings.

## 2024-03-28 NOTE — PATIENT PROFILE ADULT - FALL HARM RISK - RISK INTERVENTIONS
Assistance OOB with selected safe patient handling equipment/Communicate Fall Risk and Risk Factors to all staff, patient, and family/Reinforce activity limits and safety measures with patient and family/Visual Cue: Yellow wristband/Bed in lowest position, wheels locked, appropriate side rails in place/Call bell, personal items and telephone in reach/Instruct patient to call for assistance before getting out of bed or chair/Non-slip footwear when patient is out of bed/[place] to call system/Physically safe environment - no spills, clutter or unnecessary equipment/Purposeful Proactive Rounding/Room/bathroom lighting operational, light cord in reach

## 2024-03-28 NOTE — PROGRESS NOTE ADULT - PROBLEM SELECTOR PLAN 2
pt with L shoulder and back pain, unable to fully range L shoulder 2/2 pain  - XR negative for acute fracture - likely in setting of trauma from fall    - pain control with lidocaine patch, ketorolac prn, Tylenol prn

## 2024-03-28 NOTE — PHYSICAL THERAPY INITIAL EVALUATION ADULT - ADDITIONAL COMMENTS
Patient lives in pvt house with family  3 steps to enter. No steps to enter.  Patient ambulated without AD independent. pt owns no dme s at home.

## 2024-03-28 NOTE — PROGRESS NOTE ADULT - SUBJECTIVE AND OBJECTIVE BOX
[facility] Division of Hospital Medicine  Ayala Casey MD  Available via MS Teams    SUBJECTIVE / OVERNIGHT EVENTS:  no acute events overnight   felt a little dizzy this AM while working with PT  no CP, no SOB, no n/v. Endorsing L shoulder pain, L eye swelling, L hip and knee pain   tele - in sinus, no events     ADDITIONAL REVIEW OF SYSTEMS:  14 point ROS negative except as noted above     MEDICATIONS  (STANDING):  lidocaine   4% Patch 1 Patch Transdermal daily    MEDICATIONS  (PRN):  acetaminophen     Tablet .. 650 milliGRAM(s) Oral every 6 hours PRN Temp greater or equal to 38C (100.4F), Mild Pain (1 - 3)  ketorolac   Injectable 15 milliGRAM(s) IV Push every 6 hours PRN Moderate Pain (4 - 6)      I&O's Summary    28 Mar 2024 07:01  -  28 Mar 2024 11:36  --------------------------------------------------------  IN: 240 mL / OUT: 0 mL / NET: 240 mL        PHYSICAL EXAM:  Vital Signs Last 24 Hrs  T(C): 36.6 (28 Mar 2024 07:37), Max: 36.8 (27 Mar 2024 17:47)  T(F): 97.9 (28 Mar 2024 07:37), Max: 98.2 (27 Mar 2024 17:47)  HR: 64 (28 Mar 2024 09:29) (62 - 76)  BP: 111/71 (28 Mar 2024 09:29) (106/67 - 133/94)  BP(mean): 88 (28 Mar 2024 09:23) (78 - 108)  RR: 18 (28 Mar 2024 09:29) (16 - 18)  SpO2: 98% (28 Mar 2024 09:29) (96% - 100%)    Parameters below as of 28 Mar 2024 09:29  Patient On (Oxygen Delivery Method): room air      PHYSICAL EXAM:  GENERAL: NAD, well-developed  HEAD:  L side temporal laceration, normocephalic  EYES: L eye swollen shut, conjunctiva and sclera clear  NECK: Supple, no JVD  CHEST/LUNG: Clear to auscultation bilaterally; no wheezing or rales  HEART: Regular rate and rhythm; no murmurs, no LE edema   ABDOMEN: Soft, nontender, nondistended; bowel sounds present  EXTREMITIES:  2+ Peripheral Pulses, no clubbing, cyanosis; bruising on L knee   PSYCH: AAOx3, calm affect, not anxious  SKIN: No rashes or lesions  MUSCULOSKELETAL: L shoulder/arm tenderness, cannot fully range L shoulder 2/2 pain. L paraspinal tenderness,      LABS:                        13.3   7.54  )-----------( 216      ( 28 Mar 2024 05:34 )             38.9     03-28    139  |  104  |  26<H>  ----------------------------<  101<H>  4.1   |  22  |  0.98    Ca    9.4      28 Mar 2024 05:34  Phos  4.0     03-27  Mg     2.2     03-27    TPro  6.8  /  Alb  3.8  /  TBili  0.6  /  DBili  x   /  AST  20  /  ALT  37  /  AlkPhos  85  03-28    PT/INR - ( 27 Mar 2024 18:12 )   PT: 11.8 sec;   INR: 1.07 ratio         PTT - ( 27 Mar 2024 18:12 )  PTT:27.4 sec      Urinalysis Basic - ( 28 Mar 2024 05:34 )    Color: x / Appearance: x / SG: x / pH: x  Gluc: 101 mg/dL / Ketone: x  / Bili: x / Urobili: x   Blood: x / Protein: x / Nitrite: x   Leuk Esterase: x / RBC: x / WBC x   Sq Epi: x / Non Sq Epi: x / Bacteria: x            RADIOLOGY & ADDITIONAL TESTS:  New Imaging Personally Reviewed Today:  New Electrocardiogram Personally Reviewed Today:  Other Results Reviewed Today:   Prior or Outpatient Records Reviewed Today with Summary:    COORDINATION OF CARE:  Consultant Communication and Details of Discussion (where applicable):

## 2024-03-29 ENCOUNTER — TRANSCRIPTION ENCOUNTER (OUTPATIENT)
Age: 54
End: 2024-03-29

## 2024-03-29 VITALS
OXYGEN SATURATION: 97 % | DIASTOLIC BLOOD PRESSURE: 71 MMHG | HEART RATE: 84 BPM | TEMPERATURE: 98 F | SYSTOLIC BLOOD PRESSURE: 120 MMHG | RESPIRATION RATE: 19 BRPM

## 2024-03-29 LAB
A1C WITH ESTIMATED AVERAGE GLUCOSE RESULT: 6 % — HIGH (ref 4–5.6)
CHOLEST SERPL-MCNC: 205 MG/DL — HIGH
ESTIMATED AVERAGE GLUCOSE: 126 MG/DL — HIGH (ref 68–114)
HDLC SERPL-MCNC: 36 MG/DL — LOW
LIPID PNL WITH DIRECT LDL SERPL: 146 MG/DL — HIGH
NON HDL CHOLESTEROL: 169 MG/DL — HIGH
TRIGL SERPL-MCNC: 126 MG/DL — SIGNIFICANT CHANGE UP

## 2024-03-29 PROCEDURE — 36415 COLL VENOUS BLD VENIPUNCTURE: CPT

## 2024-03-29 PROCEDURE — 80053 COMPREHEN METABOLIC PANEL: CPT

## 2024-03-29 PROCEDURE — 80061 LIPID PANEL: CPT

## 2024-03-29 PROCEDURE — 90715 TDAP VACCINE 7 YRS/> IM: CPT

## 2024-03-29 PROCEDURE — 73110 X-RAY EXAM OF WRIST: CPT

## 2024-03-29 PROCEDURE — 73090 X-RAY EXAM OF FOREARM: CPT

## 2024-03-29 PROCEDURE — 82947 ASSAY GLUCOSE BLOOD QUANT: CPT

## 2024-03-29 PROCEDURE — 82435 ASSAY OF BLOOD CHLORIDE: CPT

## 2024-03-29 PROCEDURE — 93005 ELECTROCARDIOGRAM TRACING: CPT

## 2024-03-29 PROCEDURE — 97161 PT EVAL LOW COMPLEX 20 MIN: CPT

## 2024-03-29 PROCEDURE — 85610 PROTHROMBIN TIME: CPT

## 2024-03-29 PROCEDURE — 73020 X-RAY EXAM OF SHOULDER: CPT

## 2024-03-29 PROCEDURE — 76377 3D RENDER W/INTRP POSTPROCES: CPT

## 2024-03-29 PROCEDURE — 90471 IMMUNIZATION ADMIN: CPT

## 2024-03-29 PROCEDURE — 82803 BLOOD GASES ANY COMBINATION: CPT

## 2024-03-29 PROCEDURE — 83605 ASSAY OF LACTIC ACID: CPT

## 2024-03-29 PROCEDURE — 73120 X-RAY EXAM OF HAND: CPT

## 2024-03-29 PROCEDURE — 96374 THER/PROPH/DIAG INJ IV PUSH: CPT

## 2024-03-29 PROCEDURE — 85025 COMPLETE CBC W/AUTO DIFF WBC: CPT

## 2024-03-29 PROCEDURE — 84484 ASSAY OF TROPONIN QUANT: CPT

## 2024-03-29 PROCEDURE — 83735 ASSAY OF MAGNESIUM: CPT

## 2024-03-29 PROCEDURE — 93356 MYOCRD STRAIN IMG SPCKL TRCK: CPT

## 2024-03-29 PROCEDURE — 83036 HEMOGLOBIN GLYCOSYLATED A1C: CPT

## 2024-03-29 PROCEDURE — 73070 X-RAY EXAM OF ELBOW: CPT

## 2024-03-29 PROCEDURE — 83880 ASSAY OF NATRIURETIC PEPTIDE: CPT

## 2024-03-29 PROCEDURE — 93306 TTE W/DOPPLER COMPLETE: CPT

## 2024-03-29 PROCEDURE — 99285 EMERGENCY DEPT VISIT HI MDM: CPT | Mod: 25

## 2024-03-29 PROCEDURE — 73562 X-RAY EXAM OF KNEE 3: CPT

## 2024-03-29 PROCEDURE — 73030 X-RAY EXAM OF SHOULDER: CPT

## 2024-03-29 PROCEDURE — 99239 HOSP IP/OBS DSCHRG MGMT >30: CPT

## 2024-03-29 PROCEDURE — 96375 TX/PRO/DX INJ NEW DRUG ADDON: CPT

## 2024-03-29 PROCEDURE — 85014 HEMATOCRIT: CPT

## 2024-03-29 PROCEDURE — 73060 X-RAY EXAM OF HUMERUS: CPT

## 2024-03-29 PROCEDURE — 84295 ASSAY OF SERUM SODIUM: CPT

## 2024-03-29 PROCEDURE — 84132 ASSAY OF SERUM POTASSIUM: CPT

## 2024-03-29 PROCEDURE — 84100 ASSAY OF PHOSPHORUS: CPT

## 2024-03-29 PROCEDURE — 75574 CT ANGIO HRT W/3D IMAGE: CPT | Mod: MC

## 2024-03-29 PROCEDURE — 71100 X-RAY EXAM RIBS UNI 2 VIEWS: CPT

## 2024-03-29 PROCEDURE — 85730 THROMBOPLASTIN TIME PARTIAL: CPT

## 2024-03-29 PROCEDURE — 85027 COMPLETE CBC AUTOMATED: CPT

## 2024-03-29 PROCEDURE — 71045 X-RAY EXAM CHEST 1 VIEW: CPT

## 2024-03-29 PROCEDURE — 85018 HEMOGLOBIN: CPT

## 2024-03-29 PROCEDURE — 72125 CT NECK SPINE W/O DYE: CPT | Mod: MC

## 2024-03-29 PROCEDURE — 70450 CT HEAD/BRAIN W/O DYE: CPT | Mod: MC

## 2024-03-29 PROCEDURE — 82330 ASSAY OF CALCIUM: CPT

## 2024-03-29 PROCEDURE — 82962 GLUCOSE BLOOD TEST: CPT

## 2024-03-29 RX ORDER — ASPIRIN/CALCIUM CARB/MAGNESIUM 324 MG
1 TABLET ORAL
Qty: 90 | Refills: 3
Start: 2024-03-29 | End: 2025-03-23

## 2024-03-29 RX ORDER — ATORVASTATIN CALCIUM 80 MG/1
40 TABLET, FILM COATED ORAL AT BEDTIME
Refills: 0 | Status: DISCONTINUED | OUTPATIENT
Start: 2024-03-29 | End: 2024-03-29

## 2024-03-29 RX ORDER — ACETAMINOPHEN 500 MG
2 TABLET ORAL
Qty: 0 | Refills: 0 | DISCHARGE
Start: 2024-03-29

## 2024-03-29 RX ORDER — LIDOCAINE 4 G/100G
1 CREAM TOPICAL
Qty: 0 | Refills: 0 | DISCHARGE
Start: 2024-03-29

## 2024-03-29 RX ORDER — ATORVASTATIN CALCIUM 80 MG/1
1 TABLET, FILM COATED ORAL
Qty: 90 | Refills: 0
Start: 2024-03-29 | End: 2024-06-26

## 2024-03-29 NOTE — DISCHARGE NOTE NURSING/CASE MANAGEMENT/SOCIAL WORK - NSDCVIVACCINE_GEN_ALL_CORE_FT
Tdap; 27-Mar-2024 18:21; Tito Farley (RN); [employer]; [id number] (Exp. Date: 20-Jul-2025); IntraMuscular; Deltoid Left.; 0.5 milliLiter(s); VIS (VIS Published: 09-May-2013, VIS Presented: 27-Mar-2024);

## 2024-03-29 NOTE — DISCHARGE NOTE PROVIDER - NSDCMRMEDTOKEN_GEN_ALL_CORE_FT
acetaminophen 325 mg oral tablet: 2 tab(s) orally every 6 hours As needed Temp greater or equal to 38C (100.4F), Mild Pain (1 - 3)  lidocaine 4% topical film: Apply topically to affected area once a day 12 hours on, 12 hours off   acetaminophen 325 mg oral tablet: 2 tab(s) orally every 6 hours As needed Temp greater or equal to 38C (100.4F), Mild Pain (1 - 3)  aspirin 81 mg oral delayed release tablet: 1 tab(s) orally once a day MDD: 1  atorvastatin 40 mg oral tablet: 1 tab(s) orally once a day (at bedtime) MDD: 1  lidocaine 4% topical film: Apply topically to affected area once a day 12 hours on, 12 hours off

## 2024-03-29 NOTE — PROGRESS NOTE ADULT - SUBJECTIVE AND OBJECTIVE BOX
DATE OF SERVICE: 03-29-24 @ 14:29    Patient is a 53y old  Male who presents with a chief complaint of syncope and collapse (29 Mar 2024 00:41)      INTERVAL HISTORY: Feels ok.     REVIEW OF SYSTEMS:  CONSTITUTIONAL: No weakness  EYES/ENT: No visual changes;  No throat pain   NECK: No pain or stiffness  RESPIRATORY: No cough, wheezing; No shortness of breath  CARDIOVASCULAR: No chest pain or palpitations  GASTROINTESTINAL: No abdominal  pain. No nausea, vomiting, or hematemesis  GENITOURINARY: No dysuria, frequency or hematuria  NEUROLOGICAL: No stroke like symptoms  SKIN: No rashes    TELEMETRY Personally reviewed: SR 60-80  	  MEDICATIONS:        PHYSICAL EXAM:  T(C): 36.4 (03-29-24 @ 13:20), Max: 36.9 (03-29-24 @ 12:15)  HR: 84 (03-29-24 @ 13:20) (63 - 84)  BP: 120/71 (03-29-24 @ 13:20) (94/59 - 120/71)  RR: 19 (03-29-24 @ 13:20) (16 - 19)  SpO2: 97% (03-29-24 @ 13:20) (96% - 100%)  Wt(kg): --  I&O's Summary    28 Mar 2024 07:01  -  29 Mar 2024 07:00  --------------------------------------------------------  IN: 480 mL / OUT: 925 mL / NET: -445 mL    29 Mar 2024 07:01  -  29 Mar 2024 14:29  --------------------------------------------------------  IN: 240 mL / OUT: 0 mL / NET: 240 mL          Appearance: In no distress	  HEENT:    PERRL, EOMI	  Cardiovascular:  S1 S2, No JVD  Respiratory: Lungs clear to auscultation	  Gastrointestinal:  Soft, Non-tender, + BS	  Vascularature:  No edema of LE  Psychiatric: Appropriate affect   Neuro: no acute focal deficits                               13.3   7.54  )-----------( 216      ( 28 Mar 2024 05:34 )             38.9     03-28    139  |  104  |  26<H>  ----------------------------<  101<H>  4.1   |  22  |  0.98    Ca    9.4      28 Mar 2024 05:34  Phos  4.0     03-27  Mg     2.2     03-27    TPro  6.8  /  Alb  3.8  /  TBili  0.6  /  DBili  x   /  AST  20  /  ALT  37  /  AlkPhos  85  03-28        Labs personally reviewed      ASSESSMENT/PLAN: 	    Patient is a 53 year old male w/ no significant past medical history , currently  undergoing outpatient work up for dizzy spells s/p Holter monitor presents after a syncopal episode on day of admission.   Patient was found down on the ground by family outside the home of his In-laws . He reports he was walking but cannot recall passing out. He sustained trauma to his head and left side of body  and reports a headache and left arm pain since the incident, worse with movement.  No chest pain or palpitations preceding the episode and no shortness of breath. No recent illness no fever or chills.     1. Syncope  - Has been having dizziness following exertion at the gym relieved with rest  - Syncopal episode with no prodrome  - ECG NSR  - Trop neg  - TTE wnl  - Exercise ST at OP Cards reportedly wnl  - CTH with no acute infarct or hemmorhage  - Cont to monitor on tele  - Risk factor for CAD include DM II, HLD and +FHX (father with CAD)  - CTA heart shows mild, non-obstructive CAD  - Recommend ASA 81mg PO daily (also given DM II and +FHX)  - He reports his wife is going to return event monitor to PCP today to acquire results of holter.    2. DM II  - A1c 6.0  - As per patient, recently started on Meformin 500mg PO BID and syncopal episode happened on the day he started the medication  - ?? hypoglycemia event  - Recommended routine monitoring of blood glucose    3. HLD  -   - Started on Atorvastatin 40mg PO daily        Ritika Wills, LISA-NP   Jonathan Sandoval DO [facility]  Cardiovascular Medicine  [address], [address]  Available through call or text on [employer] TE[employer]  Office: [phone]   DATE OF SERVICE: 03-29-24 @ 14:29    Patient is a 53y old  Male who presents with a chief complaint of syncope and collapse (29 Mar 2024 00:41)      INTERVAL HISTORY: Feels ok.     REVIEW OF SYSTEMS:  CONSTITUTIONAL: No weakness  EYES/ENT: No visual changes;  No throat pain   NECK: No pain or stiffness  RESPIRATORY: No cough, wheezing; No shortness of breath  CARDIOVASCULAR: No chest pain or palpitations  GASTROINTESTINAL: No abdominal  pain. No nausea, vomiting, or hematemesis  GENITOURINARY: No dysuria, frequency or hematuria  NEUROLOGICAL: No stroke like symptoms  SKIN: No rashes    TELEMETRY Personally reviewed: SR 60-80  	  MEDICATIONS:        PHYSICAL EXAM:  T(C): 36.4 (03-29-24 @ 13:20), Max: 36.9 (03-29-24 @ 12:15)  HR: 84 (03-29-24 @ 13:20) (63 - 84)  BP: 120/71 (03-29-24 @ 13:20) (94/59 - 120/71)  RR: 19 (03-29-24 @ 13:20) (16 - 19)  SpO2: 97% (03-29-24 @ 13:20) (96% - 100%)  Wt(kg): --  I&O's Summary    28 Mar 2024 07:01  -  29 Mar 2024 07:00  --------------------------------------------------------  IN: 480 mL / OUT: 925 mL / NET: -445 mL    29 Mar 2024 07:01  -  29 Mar 2024 14:29  --------------------------------------------------------  IN: 240 mL / OUT: 0 mL / NET: 240 mL          Appearance: In no distress	  HEENT:    PERRL, EOMI	  Cardiovascular:  S1 S2, No JVD  Respiratory: Lungs clear to auscultation	  Gastrointestinal:  Soft, Non-tender, + BS	  Vascularature:  No edema of LE  Psychiatric: Appropriate affect   Neuro: no acute focal deficits                               13.3   7.54  )-----------( 216      ( 28 Mar 2024 05:34 )             38.9     03-28    139  |  104  |  26<H>  ----------------------------<  101<H>  4.1   |  22  |  0.98    Ca    9.4      28 Mar 2024 05:34  Phos  4.0     03-27  Mg     2.2     03-27    TPro  6.8  /  Alb  3.8  /  TBili  0.6  /  DBili  x   /  AST  20  /  ALT  37  /  AlkPhos  85  03-28        Labs personally reviewed      ASSESSMENT/PLAN: 	    Patient is a 53 year old male w/ no significant past medical history , currently  undergoing outpatient work up for dizzy spells s/p Holter monitor presents after a syncopal episode on day of admission.   Patient was found down on the ground by family outside the home of his In-laws . He reports he was walking but cannot recall passing out. He sustained trauma to his head and left side of body  and reports a headache and left arm pain since the incident, worse with movement.  No chest pain or palpitations preceding the episode and no shortness of breath. No recent illness no fever or chills.     1. Syncope  - Has been having dizziness following exertion at the gym relieved with rest  - Syncopal episode with no prodrome  - ECG NSR  - Trop neg  - TTE wnl  - Exercise ST at OP Cards reportedly wnl  - CTH with no acute infarct or hemmorhage  - Cont to monitor on tele  - Risk factor for CAD include DM II, HLD and +FHX (father with CAD)  - CTA heart shows mild, non-obstructive CAD  - Recommend ASA 81mg PO daily (also given DM II and +FHX)  - He reports his wife is going to return event monitor to PCP today to acquire results.    2. DM II  - A1c 6.0  - As per patient, recently started on Meformin 500mg PO BID and syncopal episode happened on the day he started the medication  - ?? hypo-glycemia event  - Recommended routine monitoring of blood glucose    3. HLD  -   - Started on Atorvastatin 40mg PO daily        Ritika Wills, LISA-NP   Jonathan Sandoval DO [facility]  Cardiovascular Medicine  [address], [address]  Available through call or text on [employer] TE[employer]  Office: [phone]

## 2024-03-29 NOTE — DISCHARGE NOTE PROVIDER - NSDCCPCAREPLAN_GEN_ALL_CORE_FT
PRINCIPAL DISCHARGE DIAGNOSIS  Diagnosis: Syncope  Assessment and Plan of Treatment: Stand up slowly from a laying or seated position. Sit down if you feel dizzy or lightheaded. Check your blood pressure before taking any antihypertension medications.     PRINCIPAL DISCHARGE DIAGNOSIS  Diagnosis: Syncope  Assessment and Plan of Treatment: You had a cardiac work-up including CTA coronaries, which showed no significant disease. Please hydrate well. Stand up slowly from a laying or seated position. Sit down if you feel dizzy or lightheaded. Check your blood pressure before taking any antihypertension medications.  Please follow-up with your PMD for Holter monitor findings.   Please see cardiology as needed      SECONDARY DISCHARGE DIAGNOSES  Diagnosis: Left arm pain  Assessment and Plan of Treatment: You have left arm/back and shoulder pain from the fall due to loss of consciousness; no fractures were noted. Continue to take pain medication as needed.

## 2024-03-29 NOTE — DISCHARGE NOTE PROVIDER - PROVIDER TOKENS
PROVIDER:[TOKEN:[[id number]],FOLLOWUP:[2 weeks]] PROVIDER:[TOKEN:[[id number]],FOLLOWUP:[2 weeks]],PROVIDER:[TOKEN:[[id number]]]

## 2024-03-29 NOTE — DISCHARGE NOTE PROVIDER - NSDCCPTREATMENT_GEN_ALL_CORE_FT
PRINCIPAL PROCEDURE  Procedure: CT scan  Findings and Treatment: CT head negative, CT c-spine negative      SECONDARY PROCEDURE  Procedure: Transthoracic echocardiography (TTE)  Findings and Treatment: EF 61%    Procedure: CTA coronary arteries  Findings and Treatment:      PRINCIPAL PROCEDURE  Procedure: CT scan  Findings and Treatment: CT head negative, CT c-spine negative      SECONDARY PROCEDURE  Procedure: Transthoracic echocardiography (TTE)  Findings and Treatment: EF 61%    Procedure: CTA coronary arteries  Findings and Treatment: IMPRESSION:  CT coronary angiography shows:  LMCA: Patent.  LAD: Noncalcified plaques within the proximal and mid segments resulting   in overall minimal (less than 30%) luminal narrowing.  LCx: Minimal (less than 30%) luminal narrowing involving the mid segment   maybe related to an eccentric noncalcified plaque.  RCA: Dominant vessel, with eccentric noncalcified plaques within the   proximal and mid segments resulting in overall mild (30-49%) luminal   narrowing. 2 relative focal areas of ectasia or mild dilatation involving   the mid segment of the right coronary artery measuring up to about 4 mm.   Noncalcified plaques within the distal segment result in mild (up to 50%)   luminal narrowing.

## 2024-03-29 NOTE — DISCHARGE NOTE NURSING/CASE MANAGEMENT/SOCIAL WORK - PATIENT PORTAL LINK FT
You can access the FollowMyHealth Patient Portal offered by [facility] by registering at the following website: [email or web address]. By joining [employer]’s FollowMyHealth portal, you will also be able to view your health information using other applications (apps) compatible with our system.

## 2024-03-29 NOTE — DISCHARGE NOTE PROVIDER - HOSPITAL COURSE
HPI:  Patient is a 53 year old male w/ no significant past medical history , currently  undergoing outpatient work up for dizzy spells s/p Holter monitor presents after a syncopal episode on day of admission.   Patient was found down on the ground by family outside the home of his In-laws . He reports he was walking but cannot recall passing out. He sustained trauma to his head and left side of body  and reports a headache and left arm pain since the incident, worse with movement.  No chest pain or palpitations preceding the episode and no shortness of breath. No recent illness no fever or chills.    (27 Mar 2024 22:56)    3/27 CT head negative for mass, fracture, CT c-spine negative for mass, fracture.  3/27 B/L UE x-rays negative for fractures.    3/28 TTE EF 61%  3/28 CTA coronaries    HPI:  Patient is a 53 year old male w/ no significant past medical history , currently  undergoing outpatient work up for dizzy spells s/p Holter monitor presents after a syncopal episode on day of admission.   Patient was found down on the ground by family outside the home of his In-laws . He reports he was walking but cannot recall passing out. He sustained trauma to his head and left side of body  and reports a headache and left arm pain since the incident, worse with movement.  No chest pain or palpitations preceding the episode and no shortness of breath. No recent illness no fever or chills.    (27 Mar 2024 22:56)    3/27 CT head negative for mass, fracture, CT c-spine negative for mass, fracture.  3/27 B/L UE x-rays negative for fractures.    3/28 TTE EF 61%  3/28 CTA coronaries normal   3/29/24 D/C home today HPI:  Patient is a 53 year old male w/ no significant past medical history , currently  undergoing outpatient work up for dizzy spells s/p Holter monitor presents after a syncopal episode on day of admission.   Patient was found down on the ground by family outside the home of his In-laws . He reports he was walking but cannot recall passing out. He sustained trauma to his head and left side of body  and reports a headache and left arm pain since the incident, worse with movement.  No chest pain or palpitations preceding the episode and no shortness of breath. No recent illness no fever or chills.    (27 Mar 2024 22:56)    Hospital Course:  Pt admitted for syncope work-up, concern for possible cardiogenic etiology. Pt had CTH which showed no acute pathology or bleed. Pt had XR of his L UE given pain, which showed no fracture. Pt had recent outpt TTE and stress per pt which was reportedly normal. Pt had repeat TTE inpt which showed normal LV function, no valvular pathology. Pt seen by cardiology, who recommended CTA coronaries, which showed some minimal irregularities, but no major occlusion. Pt recommended to stay on ASA 81mg and statin.   Pt advised to f/u with PMD for Holter events.     Important Medication Changes and Reason:  START Aspirin 81mg daily  Start lipitor 40mg daily     Active or Pending Issues Requiring Follow-up:  f/u with your PMD    Advanced Directives:   [X] Full code  [ ] DNR  [ ] Hospice    Discharge Diagnoses:  Syncope  L arm pain   HLD

## 2024-03-29 NOTE — DISCHARGE NOTE PROVIDER - CARE PROVIDERS DIRECT ADDRESSES
[email or web address]

## 2024-03-29 NOTE — DISCHARGE NOTE PROVIDER - CARE PROVIDER_API CALL
Jonathan Sandoval  Cardiovascular Disease  [address], [address]  [place] [address]  Phone: [phone]  Fax: [phone]  Follow Up Time: 2 weeks   Jonathan Sandoval  Cardiovascular Disease  [address], [address]  [place] [address]  Phone: [phone]  Fax: [phone]  Follow Up Time: 2 weeks    Vladislav Romero  Internal Medicine  [address], [address]  [place] [address]  Phone: [phone]  Fax: [phone]  Follow Up Time:

## 2025-08-05 ENCOUNTER — APPOINTMENT (OUTPATIENT)
Dept: UROLOGY | Facility: CLINIC | Age: 55
End: 2025-08-05

## 2025-08-05 DIAGNOSIS — Z83.3 FAMILY HISTORY OF DIABETES MELLITUS: ICD-10-CM

## 2025-08-05 DIAGNOSIS — E78.5 HYPERLIPIDEMIA, UNSPECIFIED: ICD-10-CM

## 2025-08-05 RX ORDER — ROSUVASTATIN CALCIUM 5 MG/1
5 TABLET, FILM COATED ORAL
Qty: 90 | Refills: 3 | Status: ACTIVE | COMMUNITY
Start: 2025-08-05

## 2025-08-19 ENCOUNTER — APPOINTMENT (OUTPATIENT)
Dept: UROLOGY | Facility: CLINIC | Age: 55
End: 2025-08-19

## 2025-09-11 ENCOUNTER — APPOINTMENT (OUTPATIENT)
Dept: UROLOGY | Facility: CLINIC | Age: 55
End: 2025-09-11
Payer: COMMERCIAL

## 2025-09-11 VITALS
OXYGEN SATURATION: 98 % | HEART RATE: 69 BPM | SYSTOLIC BLOOD PRESSURE: 144 MMHG | RESPIRATION RATE: 16 BRPM | DIASTOLIC BLOOD PRESSURE: 72 MMHG

## 2025-09-11 VITALS — BODY MASS INDEX: 21.83 KG/M2 | WEIGHT: 131 LBS | HEIGHT: 65 IN

## 2025-09-11 DIAGNOSIS — R31.0 GROSS HEMATURIA: ICD-10-CM

## 2025-09-11 DIAGNOSIS — R97.20 ELEVATED PROSTATE, SPECIFIC ANTIGEN [PSA]: ICD-10-CM

## 2025-09-11 PROCEDURE — G2211 COMPLEX E/M VISIT ADD ON: CPT

## 2025-09-11 PROCEDURE — 99204 OFFICE O/P NEW MOD 45 MIN: CPT

## 2025-09-12 LAB
APPEARANCE: CLEAR
BACTERIA: NEGATIVE /HPF
BILIRUBIN URINE: NEGATIVE
BLOOD URINE: NEGATIVE
CALCIUM OXALATE CRYSTALS: PRESENT
CAST: 0 /LPF
COLOR: YELLOW
EPITHELIAL CELLS: 0 /HPF
GLUCOSE QUALITATIVE U: NEGATIVE MG/DL
KETONES URINE: NEGATIVE MG/DL
LEUKOCYTE ESTERASE URINE: NEGATIVE
MICROSCOPIC-UA: NORMAL
NITRITE URINE: NEGATIVE
PH URINE: 6.5
PROTEIN URINE: NEGATIVE MG/DL
PSA FREE FLD-MCNC: 24 %
PSA FREE SERPL-MCNC: 1.11 NG/ML
PSA SERPL-MCNC: 4.65 NG/ML
RED BLOOD CELLS URINE: 2 /HPF
REVIEW: NORMAL
SPECIFIC GRAVITY URINE: 1.03
URINE CYTOLOGY: NORMAL
UROBILINOGEN URINE: 1 MG/DL
WHITE BLOOD CELLS URINE: 0 /HPF

## 2025-09-15 LAB — BACTERIA UR CULT: NORMAL
